# Patient Record
Sex: MALE | Race: WHITE | NOT HISPANIC OR LATINO | URBAN - METROPOLITAN AREA
[De-identification: names, ages, dates, MRNs, and addresses within clinical notes are randomized per-mention and may not be internally consistent; named-entity substitution may affect disease eponyms.]

---

## 2018-05-09 ENCOUNTER — OFFICE (AMBULATORY)
Dept: URBAN - METROPOLITAN AREA CLINIC 75 | Facility: CLINIC | Age: 53
End: 2018-05-09

## 2018-05-09 VITALS
HEIGHT: 77 IN | SYSTOLIC BLOOD PRESSURE: 138 MMHG | DIASTOLIC BLOOD PRESSURE: 72 MMHG | WEIGHT: 278 LBS | HEART RATE: 68 BPM

## 2018-05-09 DIAGNOSIS — R10.9 UNSPECIFIED ABDOMINAL PAIN: ICD-10-CM

## 2018-05-09 DIAGNOSIS — R19.7 DIARRHEA, UNSPECIFIED: ICD-10-CM

## 2018-05-09 PROCEDURE — 99244 OFF/OP CNSLTJ NEW/EST MOD 40: CPT

## 2018-05-25 VITALS
OXYGEN SATURATION: 95 % | DIASTOLIC BLOOD PRESSURE: 117 MMHG | DIASTOLIC BLOOD PRESSURE: 112 MMHG | HEART RATE: 75 BPM | DIASTOLIC BLOOD PRESSURE: 91 MMHG | SYSTOLIC BLOOD PRESSURE: 167 MMHG | HEIGHT: 77 IN | SYSTOLIC BLOOD PRESSURE: 184 MMHG | OXYGEN SATURATION: 90 % | OXYGEN SATURATION: 98 % | RESPIRATION RATE: 28 BRPM | RESPIRATION RATE: 20 BRPM | RESPIRATION RATE: 14 BRPM | SYSTOLIC BLOOD PRESSURE: 153 MMHG | HEART RATE: 73 BPM | HEART RATE: 70 BPM | DIASTOLIC BLOOD PRESSURE: 124 MMHG | TEMPERATURE: 98.4 F | HEART RATE: 72 BPM | SYSTOLIC BLOOD PRESSURE: 175 MMHG | DIASTOLIC BLOOD PRESSURE: 103 MMHG | SYSTOLIC BLOOD PRESSURE: 173 MMHG | RESPIRATION RATE: 22 BRPM | OXYGEN SATURATION: 96 % | HEART RATE: 77 BPM | DIASTOLIC BLOOD PRESSURE: 96 MMHG | HEART RATE: 78 BPM | DIASTOLIC BLOOD PRESSURE: 110 MMHG | TEMPERATURE: 98.6 F | SYSTOLIC BLOOD PRESSURE: 204 MMHG | OXYGEN SATURATION: 97 % | SYSTOLIC BLOOD PRESSURE: 177 MMHG | WEIGHT: 250 LBS

## 2018-05-30 ENCOUNTER — AMBULATORY SURGICAL CENTER (AMBULATORY)
Dept: URBAN - METROPOLITAN AREA SURGERY 17 | Facility: SURGERY | Age: 53
End: 2018-05-30

## 2018-05-30 ENCOUNTER — OFFICE (AMBULATORY)
Dept: URBAN - METROPOLITAN AREA PATHOLOGY 4 | Facility: PATHOLOGY | Age: 53
End: 2018-05-30
Payer: COMMERCIAL

## 2018-05-30 DIAGNOSIS — K20.9 ESOPHAGITIS, UNSPECIFIED: ICD-10-CM

## 2018-05-30 DIAGNOSIS — K29.50 UNSPECIFIED CHRONIC GASTRITIS WITHOUT BLEEDING: ICD-10-CM

## 2018-05-30 DIAGNOSIS — K21.0 GASTRO-ESOPHAGEAL REFLUX DISEASE WITH ESOPHAGITIS: ICD-10-CM

## 2018-05-30 DIAGNOSIS — R10.9 UNSPECIFIED ABDOMINAL PAIN: ICD-10-CM

## 2018-05-30 LAB
GI HISTOLOGY: A. UNSPECIFIED: (no result)
GI HISTOLOGY: B. UNSPECIFIED: (no result)
GI HISTOLOGY: PDF REPORT: (no result)

## 2018-05-30 PROCEDURE — 88305 TISSUE EXAM BY PATHOLOGIST: CPT | Performed by: INTERNAL MEDICINE

## 2018-05-30 PROCEDURE — 43239 EGD BIOPSY SINGLE/MULTIPLE: CPT | Performed by: INTERNAL MEDICINE

## 2018-05-30 RX ADMIN — PROPOFOL 50 MG: 10 INJECTION, EMULSION INTRAVENOUS at 14:46

## 2018-05-30 RX ADMIN — PROPOFOL 50 MG: 10 INJECTION, EMULSION INTRAVENOUS at 14:47

## 2018-05-30 RX ADMIN — LIDOCAINE HYDROCHLORIDE 50 MG: 10 INJECTION, SOLUTION EPIDURAL; INFILTRATION; INTRACAUDAL; PERINEURAL at 14:46

## 2018-05-30 RX ADMIN — PROPOFOL 100 MG: 10 INJECTION, EMULSION INTRAVENOUS at 14:46

## 2018-05-30 RX ADMIN — PROPOFOL 50 MG: 10 INJECTION, EMULSION INTRAVENOUS at 14:50

## 2018-05-30 NOTE — SERVICENOTES
CT with NAFLD, mild splenomegaly, hepatic hemagiomas, mild diverticular dx. Patient with 5 mo hx of LUQ pain ? etiology,,,,[place] past [place] of [place] ?? findings.
IF GES neg,,,consider repeating CN

## 2019-04-25 ENCOUNTER — TRANSCRIBE ORDERS (OUTPATIENT)
Dept: ADMINISTRATIVE | Facility: HOSPITAL | Age: 54
End: 2019-04-25

## 2019-04-25 DIAGNOSIS — G56.11 NEUROPATHY, MEDIAN NERVE, RIGHT: Primary | ICD-10-CM

## 2019-04-25 DIAGNOSIS — Z87.81 HISTORY OF NONUNION OF FRACTURE: Primary | ICD-10-CM

## 2019-05-13 ENCOUNTER — APPOINTMENT (OUTPATIENT)
Dept: CT IMAGING | Facility: HOSPITAL | Age: 54
End: 2019-05-13

## 2019-05-13 ENCOUNTER — HOSPITAL ENCOUNTER (OUTPATIENT)
Dept: INFUSION THERAPY | Facility: HOSPITAL | Age: 54
Discharge: HOME | End: 2019-05-13

## 2019-05-13 ENCOUNTER — HOSPITAL ENCOUNTER (OUTPATIENT)
Dept: GENERAL RADIOLOGY | Facility: HOSPITAL | Age: 54
Discharge: HOME | End: 2019-05-13

## 2019-05-21 ENCOUNTER — HOSPITAL ENCOUNTER (OUTPATIENT)
Dept: GENERAL RADIOLOGY | Facility: HOSPITAL | Age: 54
Discharge: HOME | End: 2019-05-21

## 2019-05-21 ENCOUNTER — APPOINTMENT (OUTPATIENT)
Dept: RADIOLOGY | Facility: HOSPITAL | Age: 54
End: 2019-05-21

## 2019-05-21 ENCOUNTER — HOSPITAL ENCOUNTER (OUTPATIENT)
Dept: GENERAL RADIOLOGY | Facility: HOSPITAL | Age: 54
Discharge: HOME | End: 2019-05-21
Admitting: RADIOLOGY

## 2019-05-21 ENCOUNTER — APPOINTMENT (OUTPATIENT)
Dept: CT IMAGING | Facility: HOSPITAL | Age: 54
End: 2019-05-21

## 2019-05-21 ENCOUNTER — HOSPITAL ENCOUNTER (OUTPATIENT)
Dept: CT IMAGING | Facility: HOSPITAL | Age: 54
Discharge: HOME | End: 2019-05-21

## 2019-05-21 VITALS
DIASTOLIC BLOOD PRESSURE: 77 MMHG | OXYGEN SATURATION: 98 % | SYSTOLIC BLOOD PRESSURE: 124 MMHG | BODY MASS INDEX: 30.11 KG/M2 | WEIGHT: 255 LBS | HEIGHT: 77 IN | TEMPERATURE: 97.7 F | HEART RATE: 87 BPM | RESPIRATION RATE: 16 BRPM

## 2019-05-21 DIAGNOSIS — Z87.81 HISTORY OF NONUNION OF FRACTURE: ICD-10-CM

## 2019-05-21 PROCEDURE — 62302 MYELOGRAPHY LUMBAR INJECTION: CPT

## 2019-05-21 PROCEDURE — 25010000002 IOPAMIDOL 61 % SOLUTION: Performed by: RADIOLOGY

## 2019-05-21 PROCEDURE — 72126 CT NECK SPINE W/DYE: CPT

## 2019-05-21 PROCEDURE — 72240 MYELOGRAPHY NECK SPINE: CPT

## 2019-05-21 RX ORDER — AMLODIPINE BESYLATE 2.5 MG/1
2.5 TABLET ORAL DAILY
COMMUNITY
Start: 2019-03-21 | End: 2020-03-20

## 2019-05-21 RX ORDER — FLUTICASONE PROPIONATE 50 MCG
2 SPRAY, SUSPENSION (ML) NASAL
COMMUNITY

## 2019-05-21 RX ORDER — METOCLOPRAMIDE 10 MG/1
10 TABLET ORAL 4 TIMES DAILY
COMMUNITY
Start: 2019-03-14

## 2019-05-21 RX ORDER — BLOOD-GLUCOSE CONTROL, NORMAL
1 EACH MISCELLANEOUS
COMMUNITY
Start: 2019-04-26 | End: 2022-11-29

## 2019-05-21 RX ORDER — HYDROCODONE BITARTRATE AND ACETAMINOPHEN 7.5; 325 MG/1; MG/1
TABLET ORAL
Refills: 0 | Status: ON HOLD | COMMUNITY
Start: 2019-05-01 | End: 2022-11-12

## 2019-05-21 RX ORDER — GLIMEPIRIDE 2 MG/1
2 TABLET ORAL
COMMUNITY
Start: 2019-04-26

## 2019-05-21 RX ORDER — OLMESARTAN MEDOXOMIL AND HYDROCHLOROTHIAZIDE 40/12.5 40; 12.5 MG/1; MG/1
1 TABLET ORAL DAILY
Status: ON HOLD | COMMUNITY
Start: 2019-03-21 | End: 2022-11-12

## 2019-05-21 RX ORDER — ESOMEPRAZOLE MAGNESIUM 40 MG/1
20 CAPSULE, DELAYED RELEASE ORAL
COMMUNITY
Start: 2019-02-15

## 2019-05-21 RX ORDER — LIDOCAINE HYDROCHLORIDE 10 MG/ML
10 INJECTION, SOLUTION INFILTRATION; PERINEURAL ONCE
Status: COMPLETED | OUTPATIENT
Start: 2019-05-21 | End: 2019-05-21

## 2019-05-21 RX ORDER — DIPHENOXYLATE HYDROCHLORIDE AND ATROPINE SULFATE 2.5; .025 MG/1; MG/1
1 TABLET ORAL DAILY
COMMUNITY

## 2019-05-21 RX ORDER — BLOOD-GLUCOSE METER
EACH MISCELLANEOUS SEE ADMIN INSTRUCTIONS
Refills: 0 | COMMUNITY
Start: 2019-04-26 | End: 2022-11-29

## 2019-05-21 RX ORDER — SILDENAFIL 100 MG/1
100 TABLET, FILM COATED ORAL AS NEEDED
COMMUNITY
Start: 2018-10-10 | End: 2022-11-29

## 2019-05-21 RX ORDER — LANCETS
EACH MISCELLANEOUS
Refills: 3 | COMMUNITY
Start: 2019-04-26 | End: 2022-11-29

## 2019-05-21 RX ORDER — TRAZODONE HYDROCHLORIDE 300 MG/1
TABLET ORAL
Status: ON HOLD | COMMUNITY
Start: 2019-05-17 | End: 2022-11-12

## 2019-05-21 RX ORDER — ALPRAZOLAM 0.5 MG/1
0.5 TABLET ORAL ONCE
Status: COMPLETED | OUTPATIENT
Start: 2019-05-21 | End: 2019-05-21

## 2019-05-21 RX ORDER — BLOOD SUGAR DIAGNOSTIC
STRIP MISCELLANEOUS
Refills: 0 | COMMUNITY
Start: 2019-04-26 | End: 2022-11-29

## 2019-05-21 RX ADMIN — LIDOCAINE HYDROCHLORIDE 5 ML: 10 INJECTION, SOLUTION INFILTRATION; PERINEURAL at 14:44

## 2019-05-21 RX ADMIN — ALPRAZOLAM 0.5 MG: 0.5 TABLET ORAL at 12:38

## 2019-05-21 RX ADMIN — IOPAMIDOL 15 ML: 612 INJECTION, SOLUTION INTRATHECAL at 14:46

## 2019-05-21 NOTE — NURSING NOTE
Returned to triage. Dressing to low back dry and intact. No complaint of headache. No distress. Fluids encouraged. Lunch served.

## 2019-05-21 NOTE — DISCHARGE INSTRUCTIONS
EDUCATION /DISCHARGE INSTRUCTIONS:  A myelogram is a special radiology procedure of the spinal cord, spinal nerves and other related structures.  You will be awake during the examination.  An area of your lower back will be cleansed with an antiseptic solution.  The physician will inject a numbing medication in your lower back.  While your back is numb, a needle will be placed in the lower back area.  A small amount of spinal fluid may be withdrawn and sent to the lab if ordered by your physician. While the needle is in the back, an injection of a contrast material (xray dye) will be given through the needle.  The contrast material will allow the physician to see the spinal cord and spinal nerves.  Once injected, the needle will be removed and a band aid will be placed over the injection site.  The table will be tilted during the process to allow the contrast material to flow to particular areas in the spine.  Following the injection and xrays, you will be taken to the CT scan where more pictures will be taken. After the procedure is finished, the contrast material will be absorbed by your body and eliminated through your kidneys.  The radiologist will study and interpret your myelogram and send the results to your physician.    Procedure risks of a myelogram include, but are not limited to:  *  Bleeding   *  seizure  *  Infection   *  Headache, possibly severe requiring  *  Contrast reaction      a blood patch  *  Nerve or cord injury  *  Paralysis and death    Benefits of the procedure:  *  Best examination for delineating pathology related to spinal cord compression from a disc and/or nerve root compression    Alternatives to the procedure:  MRI - a non invasive procedure requiring intravenous contrast injection.  Cannot be done on patients with certain pacemakers or metal in the body.  MRI risks include possible reaction to the contrast material, movement of metal located in the body.  Benefit to MRI:   Non-invasive and usually painless procedure.  THIS EDUCATION INFORMATION WAS REVIEWED PRIOR TO THE PROCEDURE AND CONSENT. Patient initials __________________Time_________________    Important information following your myelogram:  *  Lie down with your head elevated no more than 2 pillows for the next 24 hours.   *  Sit up in the car going home.  Sit up to eat and use the restroom only,  for 24 hours.  *  24 HOURS COMPLETE AT ________________________   *  Tomorrow, after 24 hours complete, take it easy and rest.  *  Do not drive for 48 hours following a myelogram  *  You may remove the bandage and shower in the morning  *  Increase your fluids for the next 24 hours.  Caffeinated drinks are encouraged.     Resume taking your blood thinner or Aspirin on ____5/22/19 after 2 pm_____    Resume taking your diabetic oral medication ___Metformin on 5/23/19 with evening dose__    Resume taking antipsychotics/antidepressant on ________N/A____________________    CALL YOUR PHYSICIAN FOR THE FOLLOWING:    * Pain at the injection site  * Reddness, swelling, bruising or drainage at the injection site  * A fever by mouth of 101.0  * Any new symptoms    If you have problems with a headache that is not relieved with rest and medication, please call the Radiology Triage Nurse desk  [phone]

## 2019-05-21 NOTE — H&P
Name: Fady Diane ADMIT: 2019   : 1965  PCP: Colby Escobar MD    MRN: [id number] LOS: 0 days   AGE/SEX: 53 y.o. male  ROOM: Room/bed info not found       Chief complaint   Patient is a 53 y.o. male presents with neck pain.     Past Surgical History:  Past Surgical History:   Procedure Laterality Date   • CERVICAL DISC SURGERY      cervical fusion and hardware   • ELBOW PROCEDURE Left     cubical tunnel repair   • HERNIA REPAIR      double hernia repair 15 years ago   • INNER EAR SURGERY Bilateral     reconstructed ear canals and ear drums   • KIDNEY STONE SURGERY      5 years ago   • NASAL SEPTUM SURGERY         Past Medical History:  Past Medical History:   Diagnosis Date   • Diabetes mellitus (CMS/HCC)    • GERD (gastroesophageal reflux disease)    • Hypertension        Home Medications:    (Not in a hospital admission)    Allergies:  Patient has no known allergies.    Family History:  No family history on file.    Social History:  Social History     Tobacco Use   • Smoking status: Not on file   Substance Use Topics   • Alcohol use: Not on file   • Drug use: Not on file        Objective     Physical Exam:   NAD   Symmetric chest rise, non labored breathing   Ab s-nd, nttp   Pulses 2+    Vital Signs  Temp:  [97.7 °F (36.5 °C)] 97.7 °F (36.5 °C)  Heart Rate:  [94] 94  Resp:  [16] 16  BP: (126)/(78) 126/78    Anticipated Surgical Procedure: FLUORO GUIDED LP FOR INTRATHECAL INJECTION OF CONTRAST AND SUBSEQUENT CT MYELOGRAM    The risks, benefits and alternatives of this procedure have been discussed with the patient or responsible party: Yes Risks discussed included but not limited to pain, bleeding (including epidural hemorrhage causing neurological compromise), infection, damaging surrounding structures, headache, intractable headache from dural CSF leak requiring a blood patch and need for further procedures. Denied anticoagulation      Demar Ye MD  19  2:07  PM

## 2019-05-21 NOTE — NURSING NOTE
1650 Patient dressed, ambulated to bathroom self, flat on stretcher resting waiting for ride.    1715 Ride arrived now, taken by wheelchair with staff member to vehicle.  Left with his .

## 2019-05-22 ENCOUNTER — TELEPHONE (OUTPATIENT)
Dept: INTERVENTIONAL RADIOLOGY/VASCULAR | Facility: HOSPITAL | Age: 54
End: 2019-05-22

## 2019-05-29 ENCOUNTER — APPOINTMENT (OUTPATIENT)
Dept: INFUSION THERAPY | Facility: HOSPITAL | Age: 54
End: 2019-05-29

## 2019-06-06 ENCOUNTER — HOSPITAL ENCOUNTER (OUTPATIENT)
Dept: INFUSION THERAPY | Facility: HOSPITAL | Age: 54
Discharge: HOME | End: 2019-06-06
Admitting: PSYCHIATRY & NEUROLOGY

## 2019-06-06 DIAGNOSIS — G56.11 NEUROPATHY, MEDIAN NERVE, RIGHT: ICD-10-CM

## 2019-06-06 PROCEDURE — 95886 MUSC TEST DONE W/N TEST COMP: CPT | Performed by: PSYCHIATRY & NEUROLOGY

## 2019-06-06 PROCEDURE — 95886 MUSC TEST DONE W/N TEST COMP: CPT

## 2019-06-06 PROCEDURE — 95910 NRV CNDJ TEST 7-8 STUDIES: CPT | Performed by: PSYCHIATRY & NEUROLOGY

## 2019-06-06 PROCEDURE — 95910 NRV CNDJ TEST 7-8 STUDIES: CPT

## 2019-06-06 NOTE — PROCEDURES
EMG REPORT    Indication: Neck pain    Findings: Right median sensory latency was prolonged at 3.8 ms.  Left median sensory latency was prolonged at 4.1 ms.  Right ulnar sensory study showed normal latency and amplitude.  Left median sensory latency showed no reliable response.  Right median motor study showed a prolonged distal latency of 4.5 ms.  Left median motor study showed a prolonged distal latency of 4.7 ms with normal velocity and amplitude.  Right ulnar motor study showed normal distal latency and amplitude.  Left median motor study showed prolonged distal latency of 6.1 ms.  Low amplitude response and slowed velocities across elbow more than distally.    Concentric needle EMG of bilateral deltoid, triceps, brachioradialis, extensor digitorum, and right first dorsal interosseous muscles showed no abnormality.  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  The amount is a mild degree and localized best to the carpal tunnel.  In addition there is evidence of a more severe chronic left ulnar neuropathy.  Localization is not possible because of the chronicity.  Needle EMG of both upper extremities failed to show evidence of superimposed cervical radiculopathy.       Pk Wilcox M.D.

## 2019-12-13 ENCOUNTER — LAB REQUISITION (OUTPATIENT)
Dept: LAB | Facility: HOSPITAL | Age: 54
End: 2019-12-13

## 2019-12-13 DIAGNOSIS — M48.02 SPINAL STENOSIS, CERVICAL REGION: ICD-10-CM

## 2019-12-13 LAB
ANION GAP SERPL CALCULATED.3IONS-SCNC: 13 MMOL/L (ref 5–15)
BASOPHILS # BLD AUTO: 0 10*3/MM3 (ref 0–0.2)
BASOPHILS NFR BLD AUTO: 0.2 % (ref 0–1.5)
BUN BLD-MCNC: 17 MG/DL (ref 6–20)
BUN/CREAT SERPL: 20 (ref 7–25)
CALCIUM SPEC-SCNC: 9.4 MG/DL (ref 8.6–10.5)
CHLORIDE SERPL-SCNC: 91 MMOL/L (ref 98–107)
CO2 SERPL-SCNC: 25 MMOL/L (ref 22–29)
CREAT BLD-MCNC: 0.85 MG/DL (ref 0.76–1.27)
DEPRECATED RDW RBC AUTO: 41.1 FL (ref 37–54)
EOSINOPHIL # BLD AUTO: 0 10*3/MM3 (ref 0–0.4)
EOSINOPHIL NFR BLD AUTO: 0.1 % (ref 0.3–6.2)
ERYTHROCYTE [DISTWIDTH] IN BLOOD BY AUTOMATED COUNT: 14.4 % (ref 12.3–15.4)
GFR SERPL CREATININE-BSD FRML MDRD: 94 ML/MIN/1.73
GLUCOSE BLD-MCNC: 289 MG/DL (ref 65–99)
HCT VFR BLD AUTO: 38.3 % (ref 37.5–51)
HGB BLD-MCNC: 13.1 G/DL (ref 13–17.7)
LYMPHOCYTES # BLD AUTO: 1 10*3/MM3 (ref 0.7–3.1)
LYMPHOCYTES NFR BLD AUTO: 8.3 % (ref 19.6–45.3)
MCH RBC QN AUTO: 27.7 PG (ref 26.6–33)
MCHC RBC AUTO-ENTMCNC: 34.3 G/DL (ref 31.5–35.7)
MCV RBC AUTO: 80.7 FL (ref 79–97)
MONOCYTES # BLD AUTO: 0.4 10*3/MM3 (ref 0.1–0.9)
MONOCYTES NFR BLD AUTO: 3 % (ref 5–12)
NEUTROPHILS # BLD AUTO: 11.2 10*3/MM3 (ref 1.7–7)
NEUTROPHILS NFR BLD AUTO: 88.4 % (ref 42.7–76)
NRBC BLD AUTO-RTO: 0 /100 WBC (ref 0–0.2)
PLATELET # BLD AUTO: 231 10*3/MM3 (ref 140–450)
PMV BLD AUTO: 6.9 FL (ref 6–12)
POTASSIUM BLD-SCNC: 4.6 MMOL/L (ref 3.5–5.2)
RBC # BLD AUTO: 4.75 10*6/MM3 (ref 4.14–5.8)
SODIUM BLD-SCNC: 129 MMOL/L (ref 136–145)
WBC NRBC COR # BLD: 12.7 10*3/MM3 (ref 3.4–10.8)

## 2019-12-13 PROCEDURE — 80048 BASIC METABOLIC PNL TOTAL CA: CPT

## 2019-12-13 PROCEDURE — 85025 COMPLETE CBC W/AUTO DIFF WBC: CPT

## 2019-12-14 ENCOUNTER — LAB REQUISITION (OUTPATIENT)
Dept: LAB | Facility: HOSPITAL | Age: 54
End: 2019-12-14

## 2019-12-14 DIAGNOSIS — E11.9 TYPE 2 DIABETES MELLITUS WITHOUT COMPLICATIONS (HCC): ICD-10-CM

## 2019-12-14 DIAGNOSIS — M48.02 SPINAL STENOSIS, CERVICAL REGION: ICD-10-CM

## 2019-12-14 DIAGNOSIS — K31.84 GASTROPARESIS: ICD-10-CM

## 2019-12-14 LAB
ANION GAP SERPL CALCULATED.3IONS-SCNC: 13 MMOL/L (ref 5–15)
BASOPHILS # BLD AUTO: 0 10*3/MM3 (ref 0–0.2)
BASOPHILS NFR BLD AUTO: 0.1 % (ref 0–1.5)
BUN BLD-MCNC: 21 MG/DL (ref 6–20)
BUN/CREAT SERPL: 25.6 (ref 7–25)
CALCIUM SPEC-SCNC: 9.1 MG/DL (ref 8.6–10.5)
CHLORIDE SERPL-SCNC: 96 MMOL/L (ref 98–107)
CO2 SERPL-SCNC: 24 MMOL/L (ref 22–29)
CREAT BLD-MCNC: 0.82 MG/DL (ref 0.76–1.27)
DEPRECATED RDW RBC AUTO: 41.6 FL (ref 37–54)
EOSINOPHIL # BLD AUTO: 0 10*3/MM3 (ref 0–0.4)
EOSINOPHIL NFR BLD AUTO: 0.1 % (ref 0.3–6.2)
ERYTHROCYTE [DISTWIDTH] IN BLOOD BY AUTOMATED COUNT: 14.4 % (ref 12.3–15.4)
GFR SERPL CREATININE-BSD FRML MDRD: 98 ML/MIN/1.73
GLUCOSE BLD-MCNC: 249 MG/DL (ref 65–99)
HCT VFR BLD AUTO: 36.8 % (ref 37.5–51)
HGB BLD-MCNC: 12.3 G/DL (ref 13–17.7)
LYMPHOCYTES # BLD AUTO: 1.1 10*3/MM3 (ref 0.7–3.1)
LYMPHOCYTES NFR BLD AUTO: 9.3 % (ref 19.6–45.3)
MCH RBC QN AUTO: 27.2 PG (ref 26.6–33)
MCHC RBC AUTO-ENTMCNC: 33.4 G/DL (ref 31.5–35.7)
MCV RBC AUTO: 81.3 FL (ref 79–97)
MONOCYTES # BLD AUTO: 0.4 10*3/MM3 (ref 0.1–0.9)
MONOCYTES NFR BLD AUTO: 3.3 % (ref 5–12)
NEUTROPHILS # BLD AUTO: 10.1 10*3/MM3 (ref 1.7–7)
NEUTROPHILS NFR BLD AUTO: 87.2 % (ref 42.7–76)
NRBC BLD AUTO-RTO: 0 /100 WBC (ref 0–0.2)
PLATELET # BLD AUTO: 224 10*3/MM3 (ref 140–450)
PMV BLD AUTO: 7.2 FL (ref 6–12)
POTASSIUM BLD-SCNC: 5.1 MMOL/L (ref 3.5–5.2)
RBC # BLD AUTO: 4.53 10*6/MM3 (ref 4.14–5.8)
SODIUM BLD-SCNC: 133 MMOL/L (ref 136–145)
WBC NRBC COR # BLD: 11.6 10*3/MM3 (ref 3.4–10.8)

## 2019-12-14 PROCEDURE — 85025 COMPLETE CBC W/AUTO DIFF WBC: CPT | Performed by: ORTHOPAEDIC SURGERY

## 2019-12-14 PROCEDURE — 80048 BASIC METABOLIC PNL TOTAL CA: CPT | Performed by: ORTHOPAEDIC SURGERY

## 2019-12-15 ENCOUNTER — LAB REQUISITION (OUTPATIENT)
Dept: LAB | Facility: HOSPITAL | Age: 54
End: 2019-12-15

## 2019-12-15 DIAGNOSIS — K31.84 GASTROPARESIS: ICD-10-CM

## 2019-12-15 DIAGNOSIS — M48.02 SPINAL STENOSIS, CERVICAL REGION: ICD-10-CM

## 2019-12-15 DIAGNOSIS — I10 ESSENTIAL (PRIMARY) HYPERTENSION: ICD-10-CM

## 2019-12-15 DIAGNOSIS — E11.9 TYPE 2 DIABETES MELLITUS WITHOUT COMPLICATIONS (HCC): ICD-10-CM

## 2019-12-15 LAB
ANION GAP SERPL CALCULATED.3IONS-SCNC: 9 MMOL/L (ref 5–15)
BASOPHILS # BLD AUTO: 0 10*3/MM3 (ref 0–0.2)
BASOPHILS NFR BLD AUTO: 0.1 % (ref 0–1.5)
BUN BLD-MCNC: 16 MG/DL (ref 6–20)
BUN/CREAT SERPL: 18 (ref 7–25)
CALCIUM SPEC-SCNC: 9.7 MG/DL (ref 8.6–10.5)
CHLORIDE SERPL-SCNC: 96 MMOL/L (ref 98–107)
CO2 SERPL-SCNC: 29 MMOL/L (ref 22–29)
CREAT BLD-MCNC: 0.89 MG/DL (ref 0.76–1.27)
DEPRECATED RDW RBC AUTO: 40.3 FL (ref 37–54)
EOSINOPHIL # BLD AUTO: 0.1 10*3/MM3 (ref 0–0.4)
EOSINOPHIL NFR BLD AUTO: 0.7 % (ref 0.3–6.2)
ERYTHROCYTE [DISTWIDTH] IN BLOOD BY AUTOMATED COUNT: 14.1 % (ref 12.3–15.4)
GFR SERPL CREATININE-BSD FRML MDRD: 89 ML/MIN/1.73
GLUCOSE BLD-MCNC: 157 MG/DL (ref 65–99)
HCT VFR BLD AUTO: 37.7 % (ref 37.5–51)
HGB BLD-MCNC: 13 G/DL (ref 13–17.7)
LYMPHOCYTES # BLD AUTO: 2.9 10*3/MM3 (ref 0.7–3.1)
LYMPHOCYTES NFR BLD AUTO: 24.2 % (ref 19.6–45.3)
MCH RBC QN AUTO: 27.6 PG (ref 26.6–33)
MCHC RBC AUTO-ENTMCNC: 34.5 G/DL (ref 31.5–35.7)
MCV RBC AUTO: 80.1 FL (ref 79–97)
MONOCYTES # BLD AUTO: 1.2 10*3/MM3 (ref 0.1–0.9)
MONOCYTES NFR BLD AUTO: 9.8 % (ref 5–12)
NEUTROPHILS # BLD AUTO: 7.8 10*3/MM3 (ref 1.7–7)
NEUTROPHILS NFR BLD AUTO: 65.2 % (ref 42.7–76)
NRBC BLD AUTO-RTO: 0 /100 WBC (ref 0–0.2)
PLATELET # BLD AUTO: 222 10*3/MM3 (ref 140–450)
PMV BLD AUTO: 6.7 FL (ref 6–12)
POTASSIUM BLD-SCNC: 4.3 MMOL/L (ref 3.5–5.2)
RBC # BLD AUTO: 4.71 10*6/MM3 (ref 4.14–5.8)
SODIUM BLD-SCNC: 134 MMOL/L (ref 136–145)
WBC NRBC COR # BLD: 12 10*3/MM3 (ref 3.4–10.8)

## 2019-12-15 PROCEDURE — 85025 COMPLETE CBC W/AUTO DIFF WBC: CPT | Performed by: ORTHOPAEDIC SURGERY

## 2019-12-15 PROCEDURE — 80048 BASIC METABOLIC PNL TOTAL CA: CPT | Performed by: ORTHOPAEDIC SURGERY

## 2020-05-14 ENCOUNTER — APPOINTMENT (OUTPATIENT)
Dept: CT IMAGING | Facility: HOSPITAL | Age: 55
End: 2020-05-14

## 2020-05-14 ENCOUNTER — HOSPITAL ENCOUNTER (EMERGENCY)
Facility: HOSPITAL | Age: 55
Discharge: LEFT BEFORE DISCHARGE | End: 2020-05-14

## 2020-05-14 ENCOUNTER — HOSPITAL ENCOUNTER (EMERGENCY)
Facility: HOSPITAL | Age: 55
Discharge: HOME | End: 2020-05-14
Attending: EMERGENCY MEDICINE | Admitting: EMERGENCY MEDICINE

## 2020-05-14 VITALS
RESPIRATION RATE: 16 BRPM | HEART RATE: 89 BPM | WEIGHT: 255 LBS | TEMPERATURE: 98 F | SYSTOLIC BLOOD PRESSURE: 153 MMHG | BODY MASS INDEX: 30.11 KG/M2 | HEIGHT: 77 IN | DIASTOLIC BLOOD PRESSURE: 98 MMHG | OXYGEN SATURATION: 97 %

## 2020-05-14 VITALS
RESPIRATION RATE: 18 BRPM | HEIGHT: 77 IN | TEMPERATURE: 97.6 F | OXYGEN SATURATION: 97 % | BODY MASS INDEX: 30.24 KG/M2 | HEART RATE: 82 BPM

## 2020-05-14 DIAGNOSIS — N20.1 RIGHT URETERAL STONE: Primary | ICD-10-CM

## 2020-05-14 LAB
ALBUMIN SERPL-MCNC: 4.6 G/DL (ref 3.5–5.2)
ALBUMIN/GLOB SERPL: 1.9 G/DL
ALP SERPL-CCNC: 56 U/L (ref 39–117)
ALT SERPL W P-5'-P-CCNC: 36 U/L (ref 1–41)
ANION GAP SERPL CALCULATED.3IONS-SCNC: 13.5 MMOL/L (ref 5–15)
AST SERPL-CCNC: 25 U/L (ref 1–40)
BACTERIA UR QL AUTO: ABNORMAL /HPF
BASOPHILS # BLD AUTO: 0.03 10*3/MM3 (ref 0–0.2)
BASOPHILS NFR BLD AUTO: 0.2 % (ref 0–1.5)
BILIRUB SERPL-MCNC: 0.6 MG/DL (ref 0.2–1.2)
BILIRUB UR QL STRIP: NEGATIVE
BUN BLD-MCNC: 15 MG/DL (ref 6–20)
BUN/CREAT SERPL: 12.5 (ref 7–25)
CALCIUM SPEC-SCNC: 9.5 MG/DL (ref 8.6–10.5)
CHLORIDE SERPL-SCNC: 98 MMOL/L (ref 98–107)
CLARITY UR: CLEAR
CO2 SERPL-SCNC: 26.5 MMOL/L (ref 22–29)
COLOR UR: YELLOW
CREAT BLD-MCNC: 1.2 MG/DL (ref 0.76–1.27)
DEPRECATED RDW RBC AUTO: 39.7 FL (ref 37–54)
EOSINOPHIL # BLD AUTO: 0.06 10*3/MM3 (ref 0–0.4)
EOSINOPHIL NFR BLD AUTO: 0.5 % (ref 0.3–6.2)
ERYTHROCYTE [DISTWIDTH] IN BLOOD BY AUTOMATED COUNT: 13.7 % (ref 12.3–15.4)
GFR SERPL CREATININE-BSD FRML MDRD: 63 ML/MIN/1.73
GLOBULIN UR ELPH-MCNC: 2.4 GM/DL
GLUCOSE BLD-MCNC: 216 MG/DL (ref 65–99)
GLUCOSE UR STRIP-MCNC: NEGATIVE MG/DL
HCT VFR BLD AUTO: 39.3 % (ref 37.5–51)
HGB BLD-MCNC: 13.2 G/DL (ref 13–17.7)
HGB UR QL STRIP.AUTO: NEGATIVE
HOLD SPECIMEN: NORMAL
HYALINE CASTS UR QL AUTO: ABNORMAL /LPF
IMM GRANULOCYTES # BLD AUTO: 0.06 10*3/MM3 (ref 0–0.05)
IMM GRANULOCYTES NFR BLD AUTO: 0.5 % (ref 0–0.5)
KETONES UR QL STRIP: ABNORMAL
LEUKOCYTE ESTERASE UR QL STRIP.AUTO: ABNORMAL
LYMPHOCYTES # BLD AUTO: 1.55 10*3/MM3 (ref 0.7–3.1)
LYMPHOCYTES NFR BLD AUTO: 12.6 % (ref 19.6–45.3)
MCH RBC QN AUTO: 27.1 PG (ref 26.6–33)
MCHC RBC AUTO-ENTMCNC: 33.6 G/DL (ref 31.5–35.7)
MCV RBC AUTO: 80.7 FL (ref 79–97)
MONOCYTES # BLD AUTO: 0.73 10*3/MM3 (ref 0.1–0.9)
MONOCYTES NFR BLD AUTO: 5.9 % (ref 5–12)
NEUTROPHILS # BLD AUTO: 9.84 10*3/MM3 (ref 1.7–7)
NEUTROPHILS NFR BLD AUTO: 80.3 % (ref 42.7–76)
NITRITE UR QL STRIP: NEGATIVE
NRBC BLD AUTO-RTO: 0 /100 WBC (ref 0–0.2)
PH UR STRIP.AUTO: 6 [PH] (ref 5–8)
PLATELET # BLD AUTO: 207 10*3/MM3 (ref 140–450)
PMV BLD AUTO: 8.8 FL (ref 6–12)
POTASSIUM BLD-SCNC: 4.2 MMOL/L (ref 3.5–5.2)
PROT SERPL-MCNC: 7 G/DL (ref 6–8.5)
PROT UR QL STRIP: NEGATIVE
RBC # BLD AUTO: 4.87 10*6/MM3 (ref 4.14–5.8)
RBC # UR: ABNORMAL /HPF
REF LAB TEST METHOD: ABNORMAL
SODIUM BLD-SCNC: 138 MMOL/L (ref 136–145)
SP GR UR STRIP: 1.03 (ref 1–1.03)
SQUAMOUS #/AREA URNS HPF: ABNORMAL /HPF
UROBILINOGEN UR QL STRIP: ABNORMAL
WBC NRBC COR # BLD: 12.27 10*3/MM3 (ref 3.4–10.8)
WBC UR QL AUTO: ABNORMAL /HPF
WHOLE BLOOD HOLD SPECIMEN: NORMAL
WHOLE BLOOD HOLD SPECIMEN: NORMAL

## 2020-05-14 PROCEDURE — 96375 TX/PRO/DX INJ NEW DRUG ADDON: CPT

## 2020-05-14 PROCEDURE — 96374 THER/PROPH/DIAG INJ IV PUSH: CPT

## 2020-05-14 PROCEDURE — 99211 OFF/OP EST MAY X REQ PHY/QHP: CPT

## 2020-05-14 PROCEDURE — 80053 COMPREHEN METABOLIC PANEL: CPT | Performed by: PHYSICIAN ASSISTANT

## 2020-05-14 PROCEDURE — 25010000002 KETOROLAC TROMETHAMINE PER 15 MG: Performed by: PHYSICIAN ASSISTANT

## 2020-05-14 PROCEDURE — 99283 EMERGENCY DEPT VISIT LOW MDM: CPT

## 2020-05-14 PROCEDURE — 25010000002 ONDANSETRON PER 1 MG: Performed by: PHYSICIAN ASSISTANT

## 2020-05-14 PROCEDURE — 81001 URINALYSIS AUTO W/SCOPE: CPT

## 2020-05-14 PROCEDURE — 74176 CT ABD & PELVIS W/O CONTRAST: CPT

## 2020-05-14 PROCEDURE — 85025 COMPLETE CBC W/AUTO DIFF WBC: CPT | Performed by: PHYSICIAN ASSISTANT

## 2020-05-14 RX ORDER — KETOROLAC TROMETHAMINE 10 MG/1
10 TABLET, FILM COATED ORAL EVERY 8 HOURS PRN
Qty: 15 TABLET | Refills: 0 | Status: ON HOLD | OUTPATIENT
Start: 2020-05-14 | End: 2022-11-12

## 2020-05-14 RX ORDER — OXYCODONE HYDROCHLORIDE AND ACETAMINOPHEN 5; 325 MG/1; MG/1
1 TABLET ORAL EVERY 4 HOURS PRN
Qty: 10 TABLET | Refills: 0 | Status: ON HOLD | OUTPATIENT
Start: 2020-05-14 | End: 2022-11-12

## 2020-05-14 RX ORDER — ONDANSETRON 2 MG/ML
4 INJECTION INTRAMUSCULAR; INTRAVENOUS ONCE
Status: COMPLETED | OUTPATIENT
Start: 2020-05-14 | End: 2020-05-14

## 2020-05-14 RX ORDER — KETOROLAC TROMETHAMINE 15 MG/ML
15 INJECTION, SOLUTION INTRAMUSCULAR; INTRAVENOUS ONCE
Status: COMPLETED | OUTPATIENT
Start: 2020-05-14 | End: 2020-05-14

## 2020-05-14 RX ORDER — ONDANSETRON 4 MG/1
4 TABLET, ORALLY DISINTEGRATING ORAL EVERY 8 HOURS PRN
Qty: 10 TABLET | Refills: 0 | Status: ON HOLD | OUTPATIENT
Start: 2020-05-14 | End: 2022-11-12

## 2020-05-14 RX ORDER — TAMSULOSIN HYDROCHLORIDE 0.4 MG/1
1 CAPSULE ORAL DAILY
Qty: 30 CAPSULE | Refills: 0 | Status: ON HOLD | OUTPATIENT
Start: 2020-05-14 | End: 2022-11-12

## 2020-05-14 RX ADMIN — SODIUM CHLORIDE, POTASSIUM CHLORIDE, SODIUM LACTATE AND CALCIUM CHLORIDE 1000 ML: 600; 310; 30; 20 INJECTION, SOLUTION INTRAVENOUS at 18:33

## 2020-05-14 RX ADMIN — ONDANSETRON 4 MG: 2 INJECTION INTRAMUSCULAR; INTRAVENOUS at 18:30

## 2020-05-14 RX ADMIN — KETOROLAC TROMETHAMINE 15 MG: 15 INJECTION, SOLUTION INTRAMUSCULAR; INTRAVENOUS at 18:32

## 2020-05-14 NOTE — ED PROVIDER NOTES
Pt presents to the ED c/o  acute onset right flank pain that began last night. He does have a hx of kidney stones.      On exam,   Mild RUQ tenderness and right flank tenderness.     Plan: Discussed CT findings of 6mm R ureteral stone.  Pain is well controlled.  Home with pain control, urology folllow up, return precautions.     Ct Abdomen Pelvis Without Contrast    Result Date: 2020  Narrative: CT SCAN OF THE ABDOMEN AND PELVIS WITHOUT CONTRAST  HISTORY: Right flank pain.  FINDINGS: The CT scan was performed as an emergency procedure through the abdomen and pelvis without contrast and demonstrates the followin. There is mild to moderate right renal obstruction secondary to a 7 mm stone at the right ureteropelvic junction as seen on image 78. There are several smaller stones scattered in the right kidney. There is no left renal stone but there is a probable faintly visualized small left renal cyst anteriorly. 2. There is some minimal faint groundglass opacity at the lung bases that is nonspecific. There are several tiny low-density lesions in the liver likely representing tiny cysts. The spleen is slightly prominent in size. The pancreas and both adrenal glands and gallbladder are unremarkable. 3. There is no aortic aneurysm or retroperitoneal lymphadenopathy. No abnormality is seen in the pelvis.      Radiation dose reduction techniques were utilized, including automated exposure control and exposure modulation based on body size.  This report was finalized on 2020 8:03 PM by Dr. Ganesh Vargas M.D.        Final diagnoses:   Right ureteral stone       DISCHARGE    Patient discharged in stable condition.    Reviewed implications of results, diagnosis, meds, responsibility to follow up, warning signs and symptoms of possible worsening, potential complications and reasons to return to ER, including worsening pain, fever.    Patient/Family voiced understanding of above instructions.    Discussed plan for  discharge, as there is no emergent indication for admission. Patient referred to primary care provider for BP management due to today's BP. Pt/family is agreeable and understands need for follow up and repeat testing.  Pt is aware that discharge does not mean that nothing is wrong but it indicates no emergency is present that requires admission and they must continue care with follow-up as given below or physician of their choice.     FOLLOW-UP  Babak Hopkins MD  [address] [place] [address]  [phone]    Schedule an appointment as soon as possible for a visit            Medication List      New Prescriptions    ketorolac 10 MG tablet  Commonly known as:  TORADOL  Take 1 tablet by mouth Every 8 (Eight) Hours As Needed for Moderate Pain .     ondansetron ODT 4 MG disintegrating tablet  Commonly known as:  ZOFRAN-ODT  Place 1 tablet on the tongue Every 8 (Eight) Hours As Needed for Nausea.     oxyCODONE-acetaminophen 5-325 MG per tablet  Commonly known as:  PERCOCET  Take 1 tablet by mouth Every 4 (Four) Hours As Needed for Severe Pain .     tamsulosin 0.4 MG capsule 24 hr capsule  Commonly known as:  FLOMAX  Take 1 capsule by mouth Daily.               Attestation:  The ANTONELLA and I have discussed this patient's history, physical exam, and treatment plan.  I have reviewed the documentation and personally had a face to face interaction with the patient. I affirm the documentation and agree with the treatment and plan.  The attached note describes my personal findings.          Lazaro Dixon MD  05/14/20 4052

## 2020-05-14 NOTE — ED PROVIDER NOTES
" EMERGENCY DEPARTMENT ENCOUNTER    Room Number:  44/44  Date seen:  5/14/2020  Time seen: 5:38 PM  PCP: Colby Escobar MD  Historian: patient      HPI:  Chief Complaint: right flank pain    A complete HPI/ROS/PMH/PSH/SH/FH are unobtainable due to: none    Context: Fady Diane is a 54 y.o. male who presents to the ED for evaluation of sudden onset right flank pain that began yesterday around 10 or 11 PM.  He states he came to the ER then however it resolved upon arrival so he went back home.  It came back earlier which prompted him to come to the emergency room.  He rates it at a \"12 out of 10.\"  States it radiates to the right lower abdomen, is now constant and nothing seems to make it worse or better.  It is sharp and achy.  He has had some mild nausea but no vomiting, denies fever, chills, hematuria, dysuria, diarrhea.  He has a history of kidney stones and this feels the same.        PAST MEDICAL HISTORY  Active Ambulatory Problems     Diagnosis Date Noted   • No Active Ambulatory Problems     Resolved Ambulatory Problems     Diagnosis Date Noted   • No Resolved Ambulatory Problems     Past Medical History:   Diagnosis Date   • Diabetes mellitus (CMS/HCC)    • GERD (gastroesophageal reflux disease)    • Hypertension          PAST SURGICAL HISTORY  Past Surgical History:   Procedure Laterality Date   • CERVICAL DISC SURGERY  2014    cervical fusion and hardware   • ELBOW PROCEDURE Left     cubical tunnel repair   • HERNIA REPAIR      double hernia repair 15 years ago   • INNER EAR SURGERY Bilateral 2011    reconstructed ear canals and ear drums   • KIDNEY STONE SURGERY      5 years ago   • NASAL SEPTUM SURGERY  2009         FAMILY HISTORY  No family history on file.      SOCIAL HISTORY  Social History     Socioeconomic History   • Marital status:      Spouse name: Not on file   • Number of children: Not on file   • Years of education: Not on file   • Highest education level: Not on file "         ALLERGIES  Patient has no known allergies.        REVIEW OF SYSTEMS  Review of Systems     All systems reviewed and negative except for those discussed in HPI.       PHYSICAL EXAM  ED Triage Vitals [05/14/20 1732]   Temp Heart Rate Resp BP SpO2   98 °F (36.7 °C) 98 16 -- 98 %      Temp src Heart Rate Source Patient Position BP Location FiO2 (%)   -- -- -- -- --         GENERAL: not distressed  HENT: atraumatic  EYES: no scleral icterus  CV: regular rhythm, regular rate  RESPIRATORY: normal effort, ctab  ABDOMEN: soft, nontender, nondistended, normal bowel sounds, + right CVA tenderness  MUSCULOSKELETAL: no deformity  NEURO: alert, moves all extremities, follows commands  SKIN: warm, dry    Vital signs and nursing notes reviewed.          LAB RESULTS  Recent Results (from the past 24 hour(s))   Urinalysis With Microscopic If Indicated (No Culture) - Urine, Clean Catch    Collection Time: 05/14/20  5:46 PM   Result Value Ref Range    Color, UA Yellow Yellow, Straw    Appearance, UA Clear Clear    pH, UA 6.0 5.0 - 8.0    Specific Gravity, UA 1.027 1.005 - 1.030    Glucose, UA Negative Negative    Ketones, UA Trace (A) Negative    Bilirubin, UA Negative Negative    Blood, UA Negative Negative    Protein, UA Negative Negative    Leuk Esterase, UA Trace (A) Negative    Nitrite, UA Negative Negative    Urobilinogen, UA 1.0 E.U./dL 0.2 - 1.0 E.U./dL   Light Blue Top    Collection Time: 05/14/20  5:46 PM   Result Value Ref Range    Extra Tube hold for add-on    Green Top (Gel)    Collection Time: 05/14/20  5:46 PM   Result Value Ref Range    Extra Tube Hold for add-ons.    Lavender Top    Collection Time: 05/14/20  5:46 PM   Result Value Ref Range    Extra Tube hold for add-on    Urinalysis, Microscopic Only - Urine, Clean Catch    Collection Time: 05/14/20  5:46 PM   Result Value Ref Range    RBC, UA 6-12 (A) None Seen, 0-2 /HPF    WBC, UA 6-12 (A) None Seen, 0-2 /HPF    Bacteria, UA None Seen None Seen /HPF     Squamous Epithelial Cells, UA 0-2 None Seen, 0-2 /HPF    Hyaline Casts, UA 3-6 None Seen /LPF    Methodology Automated Microscopy    Comprehensive Metabolic Panel    Collection Time: 05/14/20  5:46 PM   Result Value Ref Range    Glucose 216 (H) 65 - 99 mg/dL    BUN 15 6 - 20 mg/dL    Creatinine 1.20 0.76 - 1.27 mg/dL    Sodium 138 136 - 145 mmol/L    Potassium 4.2 3.5 - 5.2 mmol/L    Chloride 98 98 - 107 mmol/L    CO2 26.5 22.0 - 29.0 mmol/L    Calcium 9.5 8.6 - 10.5 mg/dL    Total Protein 7.0 6.0 - 8.5 g/dL    Albumin 4.60 3.50 - 5.20 g/dL    ALT (SGPT) 36 1 - 41 U/L    AST (SGOT) 25 1 - 40 U/L    Alkaline Phosphatase 56 39 - 117 U/L    Total Bilirubin 0.6 0.2 - 1.2 mg/dL    eGFR Non African Amer 63 >60 mL/min/1.73    Globulin 2.4 gm/dL    A/G Ratio 1.9 g/dL    BUN/Creatinine Ratio 12.5 7.0 - 25.0    Anion Gap 13.5 5.0 - 15.0 mmol/L   CBC Auto Differential    Collection Time: 05/14/20  5:46 PM   Result Value Ref Range    WBC 12.27 (H) 3.40 - 10.80 10*3/mm3    RBC 4.87 4.14 - 5.80 10*6/mm3    Hemoglobin 13.2 13.0 - 17.7 g/dL    Hematocrit 39.3 37.5 - 51.0 %    MCV 80.7 79.0 - 97.0 fL    MCH 27.1 26.6 - 33.0 pg    MCHC 33.6 31.5 - 35.7 g/dL    RDW 13.7 12.3 - 15.4 %    RDW-SD 39.7 37.0 - 54.0 fl    MPV 8.8 6.0 - 12.0 fL    Platelets 207 140 - 450 10*3/mm3    Neutrophil % 80.3 (H) 42.7 - 76.0 %    Lymphocyte % 12.6 (L) 19.6 - 45.3 %    Monocyte % 5.9 5.0 - 12.0 %    Eosinophil % 0.5 0.3 - 6.2 %    Basophil % 0.2 0.0 - 1.5 %    Immature Grans % 0.5 0.0 - 0.5 %    Neutrophils, Absolute 9.84 (H) 1.70 - 7.00 10*3/mm3    Lymphocytes, Absolute 1.55 0.70 - 3.10 10*3/mm3    Monocytes, Absolute 0.73 0.10 - 0.90 10*3/mm3    Eosinophils, Absolute 0.06 0.00 - 0.40 10*3/mm3    Basophils, Absolute 0.03 0.00 - 0.20 10*3/mm3    Immature Grans, Absolute 0.06 (H) 0.00 - 0.05 10*3/mm3    nRBC 0.0 0.0 - 0.2 /100 WBC       Ordered the above labs and independently reviewed the results.        RADIOLOGY  CT Abdomen Pelvis Without Contrast    Final Result          I ordered the above noted radiological studies. Reviewed by me and discussed with radiologist.  See dictation for official radiology interpretation.    PROCEDURES  Procedures        MEDICATIONS GIVEN IN ER  Medications   ketorolac (TORADOL) injection 15 mg (15 mg Intravenous Given 5/14/20 1832)   ondansetron (ZOFRAN) injection 4 mg (4 mg Intravenous Given 5/14/20 1830)   lactated ringers bolus 1,000 mL (0 mL Intravenous Stopped 5/14/20 1903)             PROGRESS AND CONSULTS    DDX includes but no limited to renal colic, ureterolithiasis, pyelonephritis, muscle strain, muscle spasm, appendicitis    ED Course as of May 14 2024   Thu May 14, 2020   1918 I discussed the CT scan of the abdomen and pelvis with Dr. Vargas which shows a 7 mm stone at the right UPJ.    [KA]   1919 WBC(!): 12.27 [KA]   1919 RBC: 4.87 [KA]   1919 Hemoglobin: 13.2 [KA]   1919 Hematocrit: 39.3 [KA]   1919 Glucose(!): 216 [KA]   1919 BUN: 15 [KA]   1919 Creatinine: 1.20 [KA]   1919 RBC, UA(!): 6-12 [KA]   1919 WBC, UA(!): 6-12 [KA]   1919 Bacteria, UA: None Seen [KA]   1919 The patient got very good pain relief with Toradol.  He required nothing additional for pain.  CT scan shows right ureterolithiasis at the UPJ.  We discussed all lab and imaging results with the patient, he is to follow-up with urology and has been prescribed Zofran, Percocet, and Flomax.  He will follow-up with urology.  He is agreeable with the plan for discharge.  Return precautions discussed.   Nitrite, UA: Negative [KA]   1923 Amrit Report  Amrit report [id number] reviewed.   After examining the available clinical information and risks of prescribing controlled substances (including non-treatment or other adjunct treatments), it is considered medically appropriate that a controlled medication be prescribed.  I discussed risks/benefits/alternatives of using a controlled substance including risk of tolerance and dependence.  I discussed with  patient (and family if applicable) that the patient should not drive, operate machinery, or otherwise engage in risky behavior as this medication may impair judgment and/or motor capabilities. I discussed that the patient will receive only a short-term controlled substance prescription for management of acute symptoms and that any additional medication needs should be addressed by the primary care provider or appropriate specialist.      [KA]      ED Course User Index  [KA] Gracia Gray PA        Reviewed pt's history and workup with Dr. Dixon.  After a bedside evaluation; Dr Dixon agrees with the plan of care      Patient was placed in face mask in first look. Patient was wearing facemask each time I entered the room and throughout our encounter. I wore protective equipment throughout this patient encounter including a face mask, eye shield and gloves. Hand hygiene was performed before donning protective equipment and after removal when leaving the room.        DIAGNOSIS  Final diagnoses:   Right ureteral stone         DISPOSITION  Discharge        Latest Documented Vital Signs:  As of 20:24  BP- 153/98 HR- 89 Temp- 98 °F (36.7 °C) O2 sat- 97%       Gracia Gray PA  05/14/20 2025

## 2020-05-14 NOTE — ED NOTES
Pt presents to the ED w/cc right flank pain.  Pt states he thinks he is experiencing a kidney stone.  Pt hx renal stones.  Denies fever, chills, n/v/d, recent trauma or illness.    Pt masked at triage  Triage completed with appropriate PPE     Magali Thomas, RN  05/14/20 0253

## 2020-05-14 NOTE — ED NOTES
This RN entered exam room to collect urine specimen from pt. Pt not in room. Thorough search of the ED determined pt had eloped. MD Cantor, NIKOLAS Reyna, Charge RN Denisse notified.      Krystal Sewell RN  05/14/20 [id number]

## 2020-05-14 NOTE — ED TRIAGE NOTES
Patient presents to er via private vehicle from home.  Patient was here at ER last night but pain resolved so he left.  Patient states his right flank pain has returned so he is requesting further er evaluation.  Patient was placed in face mask during first look triage.  Patient was wearing a face mask throughout encounter.  I wore personal protective equipment throughout the encounter.  Hand hygiene was performed before and after patient encounter.

## 2022-11-11 ENCOUNTER — APPOINTMENT (OUTPATIENT)
Dept: CT IMAGING | Facility: HOSPITAL | Age: 57
End: 2022-11-11

## 2022-11-11 ENCOUNTER — APPOINTMENT (OUTPATIENT)
Dept: ULTRASOUND IMAGING | Facility: HOSPITAL | Age: 57
End: 2022-11-11

## 2022-11-11 ENCOUNTER — APPOINTMENT (OUTPATIENT)
Dept: GENERAL RADIOLOGY | Facility: HOSPITAL | Age: 57
End: 2022-11-11

## 2022-11-11 ENCOUNTER — HOSPITAL ENCOUNTER (OUTPATIENT)
Facility: HOSPITAL | Age: 57
Discharge: HOME | End: 2022-11-13
Attending: EMERGENCY MEDICINE | Admitting: SURGERY

## 2022-11-11 DIAGNOSIS — K81.0 ACUTE CHOLECYSTITIS: Primary | ICD-10-CM

## 2022-11-11 DIAGNOSIS — K82.9 GALLBLADDER DISEASE: ICD-10-CM

## 2022-11-11 DIAGNOSIS — I10 HYPERTENSION NOT AT GOAL: ICD-10-CM

## 2022-11-11 LAB
ALBUMIN SERPL-MCNC: 4.6 G/DL (ref 3.5–5.2)
ALBUMIN/GLOB SERPL: 1.6 G/DL
ALP SERPL-CCNC: 82 U/L (ref 39–117)
ALT SERPL W P-5'-P-CCNC: 23 U/L (ref 1–41)
ANION GAP SERPL CALCULATED.3IONS-SCNC: 15.6 MMOL/L (ref 5–15)
AST SERPL-CCNC: 32 U/L (ref 1–40)
BASOPHILS # BLD AUTO: 0.04 10*3/MM3 (ref 0–0.2)
BASOPHILS NFR BLD AUTO: 0.3 % (ref 0–1.5)
BILIRUB SERPL-MCNC: 1.3 MG/DL (ref 0–1.2)
BUN SERPL-MCNC: 13 MG/DL (ref 6–20)
BUN/CREAT SERPL: 11.9 (ref 7–25)
CALCIUM SPEC-SCNC: 9.6 MG/DL (ref 8.6–10.5)
CHLORIDE SERPL-SCNC: 102 MMOL/L (ref 98–107)
CO2 SERPL-SCNC: 22.4 MMOL/L (ref 22–29)
CREAT SERPL-MCNC: 1.09 MG/DL (ref 0.76–1.27)
DEPRECATED RDW RBC AUTO: 41.7 FL (ref 37–54)
EGFRCR SERPLBLD CKD-EPI 2021: 78.7 ML/MIN/1.73
EOSINOPHIL # BLD AUTO: 0.16 10*3/MM3 (ref 0–0.4)
EOSINOPHIL NFR BLD AUTO: 1.2 % (ref 0.3–6.2)
ERYTHROCYTE [DISTWIDTH] IN BLOOD BY AUTOMATED COUNT: 14 % (ref 12.3–15.4)
GLOBULIN UR ELPH-MCNC: 2.9 GM/DL
GLUCOSE SERPL-MCNC: 111 MG/DL (ref 65–99)
HCT VFR BLD AUTO: 41.6 % (ref 37.5–51)
HGB BLD-MCNC: 13.7 G/DL (ref 13–17.7)
IMM GRANULOCYTES # BLD AUTO: 0.05 10*3/MM3 (ref 0–0.05)
IMM GRANULOCYTES NFR BLD AUTO: 0.4 % (ref 0–0.5)
LIPASE SERPL-CCNC: 24 U/L (ref 13–60)
LYMPHOCYTES # BLD AUTO: 1.75 10*3/MM3 (ref 0.7–3.1)
LYMPHOCYTES NFR BLD AUTO: 13.5 % (ref 19.6–45.3)
MCH RBC QN AUTO: 27.1 PG (ref 26.6–33)
MCHC RBC AUTO-ENTMCNC: 32.9 G/DL (ref 31.5–35.7)
MCV RBC AUTO: 82.4 FL (ref 79–97)
MONOCYTES # BLD AUTO: 0.89 10*3/MM3 (ref 0.1–0.9)
MONOCYTES NFR BLD AUTO: 6.8 % (ref 5–12)
NEUTROPHILS NFR BLD AUTO: 10.11 10*3/MM3 (ref 1.7–7)
NEUTROPHILS NFR BLD AUTO: 77.8 % (ref 42.7–76)
NRBC BLD AUTO-RTO: 0 /100 WBC (ref 0–0.2)
NT-PROBNP SERPL-MCNC: 42 PG/ML (ref 0–900)
PLATELET # BLD AUTO: 229 10*3/MM3 (ref 140–450)
PMV BLD AUTO: 8.5 FL (ref 6–12)
POTASSIUM SERPL-SCNC: 3.8 MMOL/L (ref 3.5–5.2)
PROT SERPL-MCNC: 7.5 G/DL (ref 6–8.5)
QT INTERVAL: 391 MS
RBC # BLD AUTO: 5.05 10*6/MM3 (ref 4.14–5.8)
SODIUM SERPL-SCNC: 140 MMOL/L (ref 136–145)
TROPONIN T SERPL-MCNC: <0.01 NG/ML (ref 0–0.03)
WBC NRBC COR # BLD: 13 10*3/MM3 (ref 3.4–10.8)

## 2022-11-11 PROCEDURE — 93005 ELECTROCARDIOGRAM TRACING: CPT | Performed by: EMERGENCY MEDICINE

## 2022-11-11 PROCEDURE — 74174 CTA ABD&PLVS W/CONTRAST: CPT

## 2022-11-11 PROCEDURE — 99285 EMERGENCY DEPT VISIT HI MDM: CPT

## 2022-11-11 PROCEDURE — 71045 X-RAY EXAM CHEST 1 VIEW: CPT

## 2022-11-11 PROCEDURE — 71275 CT ANGIOGRAPHY CHEST: CPT

## 2022-11-11 PROCEDURE — 25010000002 MORPHINE PER 10 MG: Performed by: EMERGENCY MEDICINE

## 2022-11-11 PROCEDURE — 96375 TX/PRO/DX INJ NEW DRUG ADDON: CPT

## 2022-11-11 PROCEDURE — 85025 COMPLETE CBC W/AUTO DIFF WBC: CPT | Performed by: EMERGENCY MEDICINE

## 2022-11-11 PROCEDURE — 93010 ELECTROCARDIOGRAM REPORT: CPT | Performed by: INTERNAL MEDICINE

## 2022-11-11 PROCEDURE — 84484 ASSAY OF TROPONIN QUANT: CPT | Performed by: EMERGENCY MEDICINE

## 2022-11-11 PROCEDURE — 83880 ASSAY OF NATRIURETIC PEPTIDE: CPT | Performed by: EMERGENCY MEDICINE

## 2022-11-11 PROCEDURE — 0 IOPAMIDOL PER 1 ML: Performed by: EMERGENCY MEDICINE

## 2022-11-11 PROCEDURE — 83690 ASSAY OF LIPASE: CPT | Performed by: EMERGENCY MEDICINE

## 2022-11-11 PROCEDURE — 76705 ECHO EXAM OF ABDOMEN: CPT

## 2022-11-11 PROCEDURE — 25010000002 ONDANSETRON PER 1 MG: Performed by: EMERGENCY MEDICINE

## 2022-11-11 PROCEDURE — 80053 COMPREHEN METABOLIC PANEL: CPT | Performed by: EMERGENCY MEDICINE

## 2022-11-11 PROCEDURE — 83036 HEMOGLOBIN GLYCOSYLATED A1C: CPT | Performed by: HOSPITALIST

## 2022-11-11 RX ORDER — SODIUM CHLORIDE 0.9 % (FLUSH) 0.9 %
10 SYRINGE (ML) INJECTION AS NEEDED
Status: DISCONTINUED | OUTPATIENT
Start: 2022-11-11 | End: 2022-11-13 | Stop reason: HOSPADM

## 2022-11-11 RX ORDER — ALUMINA, MAGNESIA, AND SIMETHICONE 2400; 2400; 240 MG/30ML; MG/30ML; MG/30ML
15 SUSPENSION ORAL ONCE
Status: COMPLETED | OUTPATIENT
Start: 2022-11-11 | End: 2022-11-11

## 2022-11-11 RX ORDER — MORPHINE SULFATE 2 MG/ML
4 INJECTION, SOLUTION INTRAMUSCULAR; INTRAVENOUS ONCE
Status: COMPLETED | OUTPATIENT
Start: 2022-11-11 | End: 2022-11-11

## 2022-11-11 RX ORDER — ONDANSETRON 2 MG/ML
4 INJECTION INTRAMUSCULAR; INTRAVENOUS ONCE
Status: COMPLETED | OUTPATIENT
Start: 2022-11-11 | End: 2022-11-11

## 2022-11-11 RX ORDER — LIDOCAINE HYDROCHLORIDE 20 MG/ML
15 SOLUTION OROPHARYNGEAL ONCE
Status: COMPLETED | OUTPATIENT
Start: 2022-11-11 | End: 2022-11-11

## 2022-11-11 RX ADMIN — LIDOCAINE HYDROCHLORIDE 15 ML: 20 SOLUTION ORAL at 21:34

## 2022-11-11 RX ADMIN — ONDANSETRON 4 MG: 2 INJECTION INTRAMUSCULAR; INTRAVENOUS at 20:48

## 2022-11-11 RX ADMIN — IOPAMIDOL 95 ML: 755 INJECTION, SOLUTION INTRAVENOUS at 21:50

## 2022-11-11 RX ADMIN — ALUMINUM HYDROXIDE, MAGNESIUM HYDROXIDE, AND DIMETHICONE 15 ML: 400; 400; 40 SUSPENSION ORAL at 21:34

## 2022-11-11 RX ADMIN — MORPHINE SULFATE 4 MG: 2 INJECTION, SOLUTION INTRAMUSCULAR; INTRAVENOUS at 20:49

## 2022-11-12 ENCOUNTER — ANESTHESIA (OUTPATIENT)
Dept: PERIOP | Facility: HOSPITAL | Age: 57
End: 2022-11-12

## 2022-11-12 ENCOUNTER — APPOINTMENT (OUTPATIENT)
Dept: GENERAL RADIOLOGY | Facility: HOSPITAL | Age: 57
End: 2022-11-12

## 2022-11-12 ENCOUNTER — ANESTHESIA EVENT (OUTPATIENT)
Dept: PERIOP | Facility: HOSPITAL | Age: 57
End: 2022-11-12

## 2022-11-12 PROBLEM — E11.9 DIABETES (HCC): Status: ACTIVE | Noted: 2022-11-12

## 2022-11-12 PROBLEM — I10 HYPERTENSION: Status: ACTIVE | Noted: 2022-11-12

## 2022-11-12 PROBLEM — K81.0 ACUTE CHOLECYSTITIS: Status: ACTIVE | Noted: 2022-11-12

## 2022-11-12 LAB
ANION GAP SERPL CALCULATED.3IONS-SCNC: 10 MMOL/L (ref 5–15)
BACTERIA UR QL AUTO: NORMAL /HPF
BASOPHILS # BLD AUTO: 0.02 10*3/MM3 (ref 0–0.2)
BASOPHILS NFR BLD AUTO: 0.2 % (ref 0–1.5)
BILIRUB UR QL STRIP: NEGATIVE
BUN SERPL-MCNC: 11 MG/DL (ref 6–20)
BUN/CREAT SERPL: 11.6 (ref 7–25)
CALCIUM SPEC-SCNC: 8.4 MG/DL (ref 8.6–10.5)
CHLORIDE SERPL-SCNC: 106 MMOL/L (ref 98–107)
CLARITY UR: CLEAR
CO2 SERPL-SCNC: 22 MMOL/L (ref 22–29)
COLOR UR: ABNORMAL
CREAT SERPL-MCNC: 0.95 MG/DL (ref 0.76–1.27)
DEPRECATED RDW RBC AUTO: 38.1 FL (ref 37–54)
EGFRCR SERPLBLD CKD-EPI 2021: 92.8 ML/MIN/1.73
EOSINOPHIL # BLD AUTO: 0.08 10*3/MM3 (ref 0–0.4)
EOSINOPHIL NFR BLD AUTO: 0.8 % (ref 0.3–6.2)
ERYTHROCYTE [DISTWIDTH] IN BLOOD BY AUTOMATED COUNT: 13.4 % (ref 12.3–15.4)
GLUCOSE BLDC GLUCOMTR-MCNC: 115 MG/DL (ref 70–130)
GLUCOSE BLDC GLUCOMTR-MCNC: 157 MG/DL (ref 70–130)
GLUCOSE BLDC GLUCOMTR-MCNC: 207 MG/DL (ref 70–130)
GLUCOSE SERPL-MCNC: 112 MG/DL (ref 65–99)
GLUCOSE UR STRIP-MCNC: NEGATIVE MG/DL
HBA1C MFR BLD: 4.9 % (ref 4.8–5.6)
HCT VFR BLD AUTO: 33.6 % (ref 37.5–51)
HGB BLD-MCNC: 11.8 G/DL (ref 13–17.7)
HGB UR QL STRIP.AUTO: NEGATIVE
HYALINE CASTS UR QL AUTO: NORMAL /LPF
IMM GRANULOCYTES # BLD AUTO: 0.03 10*3/MM3 (ref 0–0.05)
IMM GRANULOCYTES NFR BLD AUTO: 0.3 % (ref 0–0.5)
KETONES UR QL STRIP: NEGATIVE
LEUKOCYTE ESTERASE UR QL STRIP.AUTO: ABNORMAL
LYMPHOCYTES # BLD AUTO: 1.45 10*3/MM3 (ref 0.7–3.1)
LYMPHOCYTES NFR BLD AUTO: 13.6 % (ref 19.6–45.3)
MCH RBC QN AUTO: 27.3 PG (ref 26.6–33)
MCHC RBC AUTO-ENTMCNC: 35.1 G/DL (ref 31.5–35.7)
MCV RBC AUTO: 77.6 FL (ref 79–97)
MONOCYTES # BLD AUTO: 1.16 10*3/MM3 (ref 0.1–0.9)
MONOCYTES NFR BLD AUTO: 10.9 % (ref 5–12)
NEUTROPHILS NFR BLD AUTO: 7.89 10*3/MM3 (ref 1.7–7)
NEUTROPHILS NFR BLD AUTO: 74.2 % (ref 42.7–76)
NITRITE UR QL STRIP: NEGATIVE
NRBC BLD AUTO-RTO: 0 /100 WBC (ref 0–0.2)
PH UR STRIP.AUTO: 8 [PH] (ref 5–8)
PLATELET # BLD AUTO: 181 10*3/MM3 (ref 140–450)
PMV BLD AUTO: 8.6 FL (ref 6–12)
POTASSIUM SERPL-SCNC: 3.6 MMOL/L (ref 3.5–5.2)
PROT UR QL STRIP: ABNORMAL
RBC # BLD AUTO: 4.33 10*6/MM3 (ref 4.14–5.8)
RBC # UR STRIP: NORMAL /HPF
REF LAB TEST METHOD: NORMAL
SARS-COV-2 RNA RESP QL NAA+PROBE: NOT DETECTED
SODIUM SERPL-SCNC: 138 MMOL/L (ref 136–145)
SP GR UR STRIP: >=1.03 (ref 1–1.03)
SQUAMOUS #/AREA URNS HPF: NORMAL /HPF
UROBILINOGEN UR QL STRIP: ABNORMAL
WBC # UR STRIP: NORMAL /HPF
WBC NRBC COR # BLD: 10.63 10*3/MM3 (ref 3.4–10.8)

## 2022-11-12 PROCEDURE — 74300 X-RAY BILE DUCTS/PANCREAS: CPT

## 2022-11-12 PROCEDURE — 25010000002 MIDAZOLAM PER 1 MG: Performed by: ANESTHESIOLOGY

## 2022-11-12 PROCEDURE — 47563 LAPARO CHOLECYSTECTOMY/GRAPH: CPT | Performed by: SURGERY

## 2022-11-12 PROCEDURE — 25010000002 HYDRALAZINE PER 20 MG: Performed by: NURSE ANESTHETIST, CERTIFIED REGISTERED

## 2022-11-12 PROCEDURE — 25010000002 DEXAMETHASONE SODIUM PHOSPHATE 20 MG/5ML SOLUTION: Performed by: NURSE ANESTHETIST, CERTIFIED REGISTERED

## 2022-11-12 PROCEDURE — 25010000002 HYDROMORPHONE 1 MG/ML SOLUTION: Performed by: NURSE ANESTHETIST, CERTIFIED REGISTERED

## 2022-11-12 PROCEDURE — 96365 THER/PROPH/DIAG IV INF INIT: CPT

## 2022-11-12 PROCEDURE — 88304 TISSUE EXAM BY PATHOLOGIST: CPT | Performed by: SURGERY

## 2022-11-12 PROCEDURE — 25010000002 KETOROLAC TROMETHAMINE PER 15 MG: Performed by: NURSE ANESTHETIST, CERTIFIED REGISTERED

## 2022-11-12 PROCEDURE — 81001 URINALYSIS AUTO W/SCOPE: CPT | Performed by: EMERGENCY MEDICINE

## 2022-11-12 PROCEDURE — 63710000001 INSULIN LISPRO (HUMAN) PER 5 UNITS: Performed by: SURGERY

## 2022-11-12 PROCEDURE — G0378 HOSPITAL OBSERVATION PER HR: HCPCS

## 2022-11-12 PROCEDURE — 47563 LAPARO CHOLECYSTECTOMY/GRAPH: CPT | Performed by: SPECIALIST/TECHNOLOGIST, OTHER

## 2022-11-12 PROCEDURE — 0 IOTHALAMATE 60 % SOLUTION: Performed by: SURGERY

## 2022-11-12 PROCEDURE — 25010000002 FENTANYL CITRATE (PF) 50 MCG/ML SOLUTION: Performed by: NURSE ANESTHETIST, CERTIFIED REGISTERED

## 2022-11-12 PROCEDURE — 99203 OFFICE O/P NEW LOW 30 MIN: CPT | Performed by: SURGERY

## 2022-11-12 PROCEDURE — 25010000002 PIPERACILLIN SOD-TAZOBACTAM PER 1 G: Performed by: NURSE PRACTITIONER

## 2022-11-12 PROCEDURE — 25010000002 PIPERACILLIN SOD-TAZOBACTAM PER 1 G: Performed by: EMERGENCY MEDICINE

## 2022-11-12 PROCEDURE — 80048 BASIC METABOLIC PNL TOTAL CA: CPT | Performed by: HOSPITALIST

## 2022-11-12 PROCEDURE — 25010000002 ONDANSETRON PER 1 MG: Performed by: NURSE ANESTHETIST, CERTIFIED REGISTERED

## 2022-11-12 PROCEDURE — 85025 COMPLETE CBC W/AUTO DIFF WBC: CPT | Performed by: HOSPITALIST

## 2022-11-12 PROCEDURE — 25010000002 MORPHINE PER 10 MG: Performed by: NURSE PRACTITIONER

## 2022-11-12 PROCEDURE — 36415 COLL VENOUS BLD VENIPUNCTURE: CPT | Performed by: HOSPITALIST

## 2022-11-12 PROCEDURE — 96376 TX/PRO/DX INJ SAME DRUG ADON: CPT

## 2022-11-12 PROCEDURE — 82962 GLUCOSE BLOOD TEST: CPT

## 2022-11-12 PROCEDURE — 25010000002 FENTANYL CITRATE (PF) 50 MCG/ML SOLUTION: Performed by: ANESTHESIOLOGY

## 2022-11-12 PROCEDURE — U0003 INFECTIOUS AGENT DETECTION BY NUCLEIC ACID (DNA OR RNA); SEVERE ACUTE RESPIRATORY SYNDROME CORONAVIRUS 2 (SARS-COV-2) (CORONAVIRUS DISEASE [COVID-19]), AMPLIFIED PROBE TECHNIQUE, MAKING USE OF HIGH THROUGHPUT TECHNOLOGIES AS DESCRIBED BY CMS-2020-01-R: HCPCS | Performed by: EMERGENCY MEDICINE

## 2022-11-12 PROCEDURE — 25010000002 NEOSTIGMINE 5 MG/10ML SOLUTION: Performed by: NURSE ANESTHETIST, CERTIFIED REGISTERED

## 2022-11-12 PROCEDURE — 25010000002 PROPOFOL 10 MG/ML EMULSION: Performed by: NURSE ANESTHETIST, CERTIFIED REGISTERED

## 2022-11-12 DEVICE — LARGE LIGATION CLIPS 6 CLIPS/CART
Type: IMPLANTABLE DEVICE | Site: ABDOMEN | Status: FUNCTIONAL
Brand: VAS-Q-CLIP

## 2022-11-12 RX ORDER — ROCURONIUM BROMIDE 10 MG/ML
INJECTION, SOLUTION INTRAVENOUS AS NEEDED
Status: DISCONTINUED | OUTPATIENT
Start: 2022-11-12 | End: 2022-11-12 | Stop reason: SURG

## 2022-11-12 RX ORDER — FLUMAZENIL 0.1 MG/ML
0.2 INJECTION INTRAVENOUS AS NEEDED
Status: DISCONTINUED | OUTPATIENT
Start: 2022-11-12 | End: 2022-11-12 | Stop reason: HOSPADM

## 2022-11-12 RX ORDER — LIDOCAINE HYDROCHLORIDE 20 MG/ML
INJECTION, SOLUTION EPIDURAL; INFILTRATION; INTRACAUDAL; PERINEURAL AS NEEDED
Status: DISCONTINUED | OUTPATIENT
Start: 2022-11-12 | End: 2022-11-12 | Stop reason: SURG

## 2022-11-12 RX ORDER — SODIUM CHLORIDE 0.9 % (FLUSH) 0.9 %
3-10 SYRINGE (ML) INJECTION AS NEEDED
Status: DISCONTINUED | OUTPATIENT
Start: 2022-11-12 | End: 2022-11-12 | Stop reason: HOSPADM

## 2022-11-12 RX ORDER — ONDANSETRON 2 MG/ML
4 INJECTION INTRAMUSCULAR; INTRAVENOUS ONCE AS NEEDED
Status: DISCONTINUED | OUTPATIENT
Start: 2022-11-12 | End: 2022-11-12 | Stop reason: HOSPADM

## 2022-11-12 RX ORDER — HYDROCODONE BITARTRATE AND ACETAMINOPHEN 7.5; 325 MG/1; MG/1
1 TABLET ORAL ONCE AS NEEDED
Status: DISCONTINUED | OUTPATIENT
Start: 2022-11-12 | End: 2022-11-12 | Stop reason: HOSPADM

## 2022-11-12 RX ORDER — AMLODIPINE BESYLATE 10 MG/1
10 TABLET ORAL DAILY
COMMUNITY

## 2022-11-12 RX ORDER — SODIUM CHLORIDE 0.9 % (FLUSH) 0.9 %
10 SYRINGE (ML) INJECTION EVERY 12 HOURS SCHEDULED
Status: DISCONTINUED | OUTPATIENT
Start: 2022-11-12 | End: 2022-11-13 | Stop reason: HOSPADM

## 2022-11-12 RX ORDER — HYDROCODONE BITARTRATE AND ACETAMINOPHEN 7.5; 325 MG/1; MG/1
1 TABLET ORAL EVERY 6 HOURS PRN
Status: DISCONTINUED | OUTPATIENT
Start: 2022-11-12 | End: 2022-11-13 | Stop reason: HOSPADM

## 2022-11-12 RX ORDER — SODIUM CHLORIDE 9 MG/ML
125 INJECTION, SOLUTION INTRAVENOUS CONTINUOUS
Status: DISCONTINUED | OUTPATIENT
Start: 2022-11-12 | End: 2022-11-13 | Stop reason: HOSPADM

## 2022-11-12 RX ORDER — MIDAZOLAM HYDROCHLORIDE 1 MG/ML
1 INJECTION INTRAMUSCULAR; INTRAVENOUS
Status: COMPLETED | OUTPATIENT
Start: 2022-11-12 | End: 2022-11-12

## 2022-11-12 RX ORDER — TAMSULOSIN HYDROCHLORIDE 0.4 MG/1
0.4 CAPSULE ORAL DAILY
Status: DISCONTINUED | OUTPATIENT
Start: 2022-11-12 | End: 2022-11-13 | Stop reason: HOSPADM

## 2022-11-12 RX ORDER — HYDRALAZINE HYDROCHLORIDE 20 MG/ML
5 INJECTION INTRAMUSCULAR; INTRAVENOUS
Status: DISCONTINUED | OUTPATIENT
Start: 2022-11-12 | End: 2022-11-12 | Stop reason: HOSPADM

## 2022-11-12 RX ORDER — MORPHINE SULFATE 2 MG/ML
4 INJECTION, SOLUTION INTRAMUSCULAR; INTRAVENOUS EVERY 4 HOURS PRN
Status: DISCONTINUED | OUTPATIENT
Start: 2022-11-12 | End: 2022-11-13 | Stop reason: HOSPADM

## 2022-11-12 RX ORDER — FENTANYL CITRATE 50 UG/ML
50 INJECTION, SOLUTION INTRAMUSCULAR; INTRAVENOUS
Status: DISCONTINUED | OUTPATIENT
Start: 2022-11-12 | End: 2022-11-12 | Stop reason: HOSPADM

## 2022-11-12 RX ORDER — ACETAMINOPHEN 160 MG/5ML
650 SOLUTION ORAL EVERY 4 HOURS PRN
Status: DISCONTINUED | OUTPATIENT
Start: 2022-11-12 | End: 2022-11-13 | Stop reason: HOSPADM

## 2022-11-12 RX ORDER — MINOXIDIL 10 MG/1
10 TABLET ORAL DAILY
Status: DISCONTINUED | OUTPATIENT
Start: 2022-11-12 | End: 2022-11-13 | Stop reason: HOSPADM

## 2022-11-12 RX ORDER — DEXAMETHASONE SODIUM PHOSPHATE 4 MG/ML
INJECTION, SOLUTION INTRA-ARTICULAR; INTRALESIONAL; INTRAMUSCULAR; INTRAVENOUS; SOFT TISSUE AS NEEDED
Status: DISCONTINUED | OUTPATIENT
Start: 2022-11-12 | End: 2022-11-12 | Stop reason: SURG

## 2022-11-12 RX ORDER — PROMETHAZINE HYDROCHLORIDE 25 MG/1
25 SUPPOSITORY RECTAL ONCE AS NEEDED
Status: DISCONTINUED | OUTPATIENT
Start: 2022-11-12 | End: 2022-11-12 | Stop reason: HOSPADM

## 2022-11-12 RX ORDER — LIDOCAINE HYDROCHLORIDE 10 MG/ML
0.5 INJECTION, SOLUTION EPIDURAL; INFILTRATION; INTRACAUDAL; PERINEURAL ONCE AS NEEDED
Status: DISCONTINUED | OUTPATIENT
Start: 2022-11-12 | End: 2022-11-12 | Stop reason: HOSPADM

## 2022-11-12 RX ORDER — NICOTINE POLACRILEX 4 MG
15 LOZENGE BUCCAL
Status: DISCONTINUED | OUTPATIENT
Start: 2022-11-12 | End: 2022-11-13 | Stop reason: HOSPADM

## 2022-11-12 RX ORDER — INSULIN LISPRO 100 [IU]/ML
0-7 INJECTION, SOLUTION INTRAVENOUS; SUBCUTANEOUS
Status: DISCONTINUED | OUTPATIENT
Start: 2022-11-12 | End: 2022-11-13 | Stop reason: HOSPADM

## 2022-11-12 RX ORDER — SODIUM CHLORIDE, SODIUM LACTATE, POTASSIUM CHLORIDE, CALCIUM CHLORIDE 600; 310; 30; 20 MG/100ML; MG/100ML; MG/100ML; MG/100ML
9 INJECTION, SOLUTION INTRAVENOUS CONTINUOUS
Status: DISCONTINUED | OUTPATIENT
Start: 2022-11-12 | End: 2022-11-13 | Stop reason: HOSPADM

## 2022-11-12 RX ORDER — ONDANSETRON 2 MG/ML
4 INJECTION INTRAMUSCULAR; INTRAVENOUS EVERY 6 HOURS PRN
Status: DISCONTINUED | OUTPATIENT
Start: 2022-11-12 | End: 2022-11-13 | Stop reason: HOSPADM

## 2022-11-12 RX ORDER — NALOXONE HCL 0.4 MG/ML
0.2 VIAL (ML) INJECTION AS NEEDED
Status: DISCONTINUED | OUTPATIENT
Start: 2022-11-12 | End: 2022-11-12 | Stop reason: HOSPADM

## 2022-11-12 RX ORDER — NEOSTIGMINE METHYLSULFATE 0.5 MG/ML
INJECTION, SOLUTION INTRAVENOUS AS NEEDED
Status: DISCONTINUED | OUTPATIENT
Start: 2022-11-12 | End: 2022-11-12 | Stop reason: SURG

## 2022-11-12 RX ORDER — ACETAMINOPHEN 500 MG
1000 TABLET ORAL EVERY 6 HOURS PRN
Status: DISCONTINUED | OUTPATIENT
Start: 2022-11-12 | End: 2022-11-13 | Stop reason: HOSPADM

## 2022-11-12 RX ORDER — MAGNESIUM HYDROXIDE 1200 MG/15ML
LIQUID ORAL AS NEEDED
Status: DISCONTINUED | OUTPATIENT
Start: 2022-11-12 | End: 2022-11-12 | Stop reason: HOSPADM

## 2022-11-12 RX ORDER — EPHEDRINE SULFATE 50 MG/ML
5 INJECTION, SOLUTION INTRAVENOUS ONCE AS NEEDED
Status: DISCONTINUED | OUTPATIENT
Start: 2022-11-12 | End: 2022-11-12 | Stop reason: HOSPADM

## 2022-11-12 RX ORDER — DIPHENHYDRAMINE HCL 25 MG
25 CAPSULE ORAL
Status: DISCONTINUED | OUTPATIENT
Start: 2022-11-12 | End: 2022-11-12 | Stop reason: HOSPADM

## 2022-11-12 RX ORDER — QUETIAPINE FUMARATE 50 MG/1
50 TABLET, FILM COATED ORAL NIGHTLY
COMMUNITY

## 2022-11-12 RX ORDER — SODIUM CHLORIDE 0.9 % (FLUSH) 0.9 %
10 SYRINGE (ML) INJECTION AS NEEDED
Status: DISCONTINUED | OUTPATIENT
Start: 2022-11-12 | End: 2022-11-13 | Stop reason: HOSPADM

## 2022-11-12 RX ORDER — METOPROLOL SUCCINATE 50 MG/1
50 TABLET, EXTENDED RELEASE ORAL DAILY
Status: DISCONTINUED | OUTPATIENT
Start: 2022-11-12 | End: 2022-11-13 | Stop reason: HOSPADM

## 2022-11-12 RX ORDER — QUETIAPINE FUMARATE 50 MG/1
50 TABLET, FILM COATED ORAL NIGHTLY
Status: DISCONTINUED | OUTPATIENT
Start: 2022-11-12 | End: 2022-11-13 | Stop reason: HOSPADM

## 2022-11-12 RX ORDER — ACETAMINOPHEN 325 MG/1
650 TABLET ORAL EVERY 4 HOURS PRN
Status: DISCONTINUED | OUTPATIENT
Start: 2022-11-12 | End: 2022-11-13 | Stop reason: HOSPADM

## 2022-11-12 RX ORDER — PANTOPRAZOLE SODIUM 40 MG/1
40 TABLET, DELAYED RELEASE ORAL EVERY MORNING
Status: DISCONTINUED | OUTPATIENT
Start: 2022-11-12 | End: 2022-11-13 | Stop reason: HOSPADM

## 2022-11-12 RX ORDER — SCOLOPAMINE TRANSDERMAL SYSTEM 1 MG/1
1 PATCH, EXTENDED RELEASE TRANSDERMAL ONCE
Status: DISCONTINUED | OUTPATIENT
Start: 2022-11-12 | End: 2022-11-12

## 2022-11-12 RX ORDER — OXYCODONE AND ACETAMINOPHEN 7.5; 325 MG/1; MG/1
1 TABLET ORAL EVERY 4 HOURS PRN
Status: DISCONTINUED | OUTPATIENT
Start: 2022-11-12 | End: 2022-11-12 | Stop reason: HOSPADM

## 2022-11-12 RX ORDER — PROMETHAZINE HYDROCHLORIDE 25 MG/1
25 TABLET ORAL ONCE AS NEEDED
Status: DISCONTINUED | OUTPATIENT
Start: 2022-11-12 | End: 2022-11-12 | Stop reason: HOSPADM

## 2022-11-12 RX ORDER — ONDANSETRON 4 MG/1
4 TABLET, FILM COATED ORAL EVERY 6 HOURS PRN
Status: DISCONTINUED | OUTPATIENT
Start: 2022-11-12 | End: 2022-11-13 | Stop reason: HOSPADM

## 2022-11-12 RX ORDER — METOPROLOL SUCCINATE 50 MG/1
50 TABLET, EXTENDED RELEASE ORAL DAILY
COMMUNITY

## 2022-11-12 RX ORDER — MINOXIDIL 10 MG/1
10 TABLET ORAL DAILY
COMMUNITY

## 2022-11-12 RX ORDER — LOSARTAN POTASSIUM 50 MG/1
50 TABLET ORAL
Status: DISCONTINUED | OUTPATIENT
Start: 2022-11-12 | End: 2022-11-13 | Stop reason: HOSPADM

## 2022-11-12 RX ORDER — FENTANYL CITRATE 0.05 MG/ML
INJECTION, SOLUTION INTRAMUSCULAR; INTRAVENOUS AS NEEDED
Status: DISCONTINUED | OUTPATIENT
Start: 2022-11-12 | End: 2022-11-12 | Stop reason: SURG

## 2022-11-12 RX ORDER — DIPHENHYDRAMINE HYDROCHLORIDE 50 MG/ML
12.5 INJECTION INTRAMUSCULAR; INTRAVENOUS
Status: DISCONTINUED | OUTPATIENT
Start: 2022-11-12 | End: 2022-11-12 | Stop reason: HOSPADM

## 2022-11-12 RX ORDER — SODIUM CHLORIDE 9 MG/ML
INJECTION, SOLUTION INTRAVENOUS AS NEEDED
Status: DISCONTINUED | OUTPATIENT
Start: 2022-11-12 | End: 2022-11-12 | Stop reason: HOSPADM

## 2022-11-12 RX ORDER — LABETALOL HYDROCHLORIDE 5 MG/ML
5 INJECTION, SOLUTION INTRAVENOUS
Status: DISCONTINUED | OUTPATIENT
Start: 2022-11-12 | End: 2022-11-12 | Stop reason: HOSPADM

## 2022-11-12 RX ORDER — FAMOTIDINE 10 MG/ML
20 INJECTION, SOLUTION INTRAVENOUS ONCE
Status: COMPLETED | OUTPATIENT
Start: 2022-11-12 | End: 2022-11-12

## 2022-11-12 RX ORDER — BUPIVACAINE HYDROCHLORIDE AND EPINEPHRINE 5; 5 MG/ML; UG/ML
INJECTION, SOLUTION EPIDURAL; INTRACAUDAL; PERINEURAL AS NEEDED
Status: DISCONTINUED | OUTPATIENT
Start: 2022-11-12 | End: 2022-11-12 | Stop reason: HOSPADM

## 2022-11-12 RX ORDER — ACETAMINOPHEN 650 MG/1
650 SUPPOSITORY RECTAL EVERY 4 HOURS PRN
Status: DISCONTINUED | OUTPATIENT
Start: 2022-11-12 | End: 2022-11-13 | Stop reason: HOSPADM

## 2022-11-12 RX ORDER — DEXTROSE MONOHYDRATE 25 G/50ML
25 INJECTION, SOLUTION INTRAVENOUS
Status: DISCONTINUED | OUTPATIENT
Start: 2022-11-12 | End: 2022-11-13 | Stop reason: HOSPADM

## 2022-11-12 RX ORDER — NITROGLYCERIN 0.4 MG/1
0.4 TABLET SUBLINGUAL
Status: DISCONTINUED | OUTPATIENT
Start: 2022-11-12 | End: 2022-11-13 | Stop reason: HOSPADM

## 2022-11-12 RX ORDER — SODIUM CHLORIDE 0.9 % (FLUSH) 0.9 %
3 SYRINGE (ML) INJECTION EVERY 12 HOURS SCHEDULED
Status: DISCONTINUED | OUTPATIENT
Start: 2022-11-12 | End: 2022-11-12 | Stop reason: HOSPADM

## 2022-11-12 RX ORDER — AMLODIPINE BESYLATE 10 MG/1
10 TABLET ORAL DAILY
Status: DISCONTINUED | OUTPATIENT
Start: 2022-11-12 | End: 2022-11-13 | Stop reason: HOSPADM

## 2022-11-12 RX ORDER — ONDANSETRON 2 MG/ML
INJECTION INTRAMUSCULAR; INTRAVENOUS AS NEEDED
Status: DISCONTINUED | OUTPATIENT
Start: 2022-11-12 | End: 2022-11-12 | Stop reason: SURG

## 2022-11-12 RX ORDER — KETOROLAC TROMETHAMINE 30 MG/ML
INJECTION, SOLUTION INTRAMUSCULAR; INTRAVENOUS AS NEEDED
Status: DISCONTINUED | OUTPATIENT
Start: 2022-11-12 | End: 2022-11-12 | Stop reason: SURG

## 2022-11-12 RX ORDER — HYDROMORPHONE HYDROCHLORIDE 1 MG/ML
0.5 INJECTION, SOLUTION INTRAMUSCULAR; INTRAVENOUS; SUBCUTANEOUS
Status: DISCONTINUED | OUTPATIENT
Start: 2022-11-12 | End: 2022-11-12 | Stop reason: HOSPADM

## 2022-11-12 RX ORDER — GLYCOPYRROLATE 0.2 MG/ML
INJECTION INTRAMUSCULAR; INTRAVENOUS AS NEEDED
Status: DISCONTINUED | OUTPATIENT
Start: 2022-11-12 | End: 2022-11-12 | Stop reason: SURG

## 2022-11-12 RX ORDER — PROPOFOL 10 MG/ML
VIAL (ML) INTRAVENOUS AS NEEDED
Status: DISCONTINUED | OUTPATIENT
Start: 2022-11-12 | End: 2022-11-12 | Stop reason: SURG

## 2022-11-12 RX ADMIN — FENTANYL CITRATE 50 MCG: 50 INJECTION INTRAMUSCULAR; INTRAVENOUS at 13:48

## 2022-11-12 RX ADMIN — HYDROMORPHONE HYDROCHLORIDE 1 MG: 1 INJECTION, SOLUTION INTRAMUSCULAR; INTRAVENOUS; SUBCUTANEOUS at 12:43

## 2022-11-12 RX ADMIN — TAZOBACTAM SODIUM AND PIPERACILLIN SODIUM 3.38 G: 375; 3 INJECTION, SOLUTION INTRAVENOUS at 00:57

## 2022-11-12 RX ADMIN — TAZOBACTAM SODIUM AND PIPERACILLIN SODIUM 3.38 G: 375; 3 INJECTION, SOLUTION INTRAVENOUS at 08:21

## 2022-11-12 RX ADMIN — SODIUM CHLORIDE 125 ML/HR: 9 INJECTION, SOLUTION INTRAVENOUS at 01:01

## 2022-11-12 RX ADMIN — MIDAZOLAM 1 MG: 1 INJECTION INTRAMUSCULAR; INTRAVENOUS at 12:32

## 2022-11-12 RX ADMIN — MINOXIDIL 10 MG: 10 TABLET ORAL at 20:08

## 2022-11-12 RX ADMIN — LIDOCAINE HYDROCHLORIDE 100 MG: 20 INJECTION, SOLUTION EPIDURAL; INFILTRATION; INTRACAUDAL; PERINEURAL at 12:47

## 2022-11-12 RX ADMIN — SODIUM CHLORIDE, POTASSIUM CHLORIDE, SODIUM LACTATE AND CALCIUM CHLORIDE: 600; 310; 30; 20 INJECTION, SOLUTION INTRAVENOUS at 13:35

## 2022-11-12 RX ADMIN — QUETIAPINE FUMARATE 50 MG: 50 TABLET, FILM COATED ORAL at 20:05

## 2022-11-12 RX ADMIN — DEXAMETHASONE SODIUM PHOSPHATE 8 MG: 4 INJECTION, SOLUTION INTRAMUSCULAR; INTRAVENOUS at 12:54

## 2022-11-12 RX ADMIN — KETOROLAC TROMETHAMINE 30 MG: 30 INJECTION, SOLUTION INTRAMUSCULAR at 13:58

## 2022-11-12 RX ADMIN — ONDANSETRON 4 MG: 2 INJECTION INTRAMUSCULAR; INTRAVENOUS at 13:58

## 2022-11-12 RX ADMIN — SCOPALAMINE 1 PATCH: 1 PATCH, EXTENDED RELEASE TRANSDERMAL at 12:09

## 2022-11-12 RX ADMIN — INSULIN LISPRO 3 UNITS: 100 INJECTION, SOLUTION INTRAVENOUS; SUBCUTANEOUS at 18:19

## 2022-11-12 RX ADMIN — SODIUM CHLORIDE, POTASSIUM CHLORIDE, SODIUM LACTATE AND CALCIUM CHLORIDE 9 ML/HR: 600; 310; 30; 20 INJECTION, SOLUTION INTRAVENOUS at 12:03

## 2022-11-12 RX ADMIN — PROPOFOL 200 MG: 10 INJECTION, EMULSION INTRAVENOUS at 12:47

## 2022-11-12 RX ADMIN — FENTANYL CITRATE 50 MCG: 50 INJECTION INTRAMUSCULAR; INTRAVENOUS at 14:37

## 2022-11-12 RX ADMIN — MIDAZOLAM 1 MG: 1 INJECTION INTRAMUSCULAR; INTRAVENOUS at 12:09

## 2022-11-12 RX ADMIN — FENTANYL CITRATE 50 MCG: 50 INJECTION INTRAMUSCULAR; INTRAVENOUS at 12:10

## 2022-11-12 RX ADMIN — GLYCOPYRROLATE 0.6 MCG: 1 INJECTION INTRAMUSCULAR; INTRAVENOUS at 13:58

## 2022-11-12 RX ADMIN — NEOSTIGMINE METHYLSULFATE 3 MG: 0.5 INJECTION INTRAVENOUS at 13:58

## 2022-11-12 RX ADMIN — FAMOTIDINE 20 MG: 10 INJECTION INTRAVENOUS at 12:09

## 2022-11-12 RX ADMIN — GLYCOPYRROLATE 0.2 MCG: 1 INJECTION INTRAMUSCULAR; INTRAVENOUS at 13:09

## 2022-11-12 RX ADMIN — TAMSULOSIN HYDROCHLORIDE 0.4 MG: 0.4 CAPSULE ORAL at 20:09

## 2022-11-12 RX ADMIN — HYDRALAZINE HYDROCHLORIDE 5 MG: 20 INJECTION INTRAMUSCULAR; INTRAVENOUS at 14:46

## 2022-11-12 RX ADMIN — LABETALOL HYDROCHLORIDE 5 MG: 5 INJECTION, SOLUTION INTRAVENOUS at 14:55

## 2022-11-12 RX ADMIN — ROCURONIUM BROMIDE 40 MG: 10 INJECTION, SOLUTION INTRAVENOUS at 12:47

## 2022-11-12 RX ADMIN — Medication 10 ML: at 20:08

## 2022-11-12 RX ADMIN — FENTANYL CITRATE 50 MCG: 50 INJECTION INTRAMUSCULAR; INTRAVENOUS at 14:02

## 2022-11-12 RX ADMIN — MORPHINE SULFATE 4 MG: 2 INJECTION, SOLUTION INTRAMUSCULAR; INTRAVENOUS at 08:21

## 2022-11-12 RX ADMIN — LINAGLIPTIN 5 MG: 5 TABLET, FILM COATED ORAL at 20:08

## 2022-11-12 RX ADMIN — Medication 10 ML: at 20:05

## 2022-11-12 NOTE — OP NOTE
OPERATIVE REPORT     Fady Diane  1965    Procedure Date: 11/12/22    Pre-op Diagnosis:  Acute cholecystitis    Post-op Diagnosis:  Post-Op Diagnosis Codes:  Same    Procedure:   Laparoscopic Cholecystectomy with Intraoperative Cholangiogram    Surgeon: Elizabeth Pritchard MD    Assistant: Marina Israel (she was responsible for laparoscopic manipulation of the gallbladder during critical portions of the dissection, handling of the laparoscopic camera, closure of all surgical incisions, and application of topical sterile dressings)    Anesthesia: General    Specimen: Gallbladder    Complications: None    Estimated Blood Loss: minimal    Indications for Operation: Fady Diane is a 57 y.o. year old male who presented to the ER with epigastric and right upper quadrant pain and was found to have acute cholecystitis.  I recommended proceeding with laparoscopic cholecystectomy with IOC.  All risks (including bleeding, infection, damage to surrounding structures), benefits, and alternatives were explained to the patient and he agreed and wished to proceed.  Informed consent was obtained.    Description of Procedure: The patient was taken to the operating room, transferred onto the operating room table, and underwent general endotracheal anesthesia without incident. The patient was prepped and draped in the usual sterile fashion.  Preoperative antibiotics were given, and a timeout was performed.  Half percent Marcaine with epinephrine was injected into the skin and subcutaneous tissues.  Incision was made superior to the umbilicus, and the Veress needle was used to initiate pneumoperitoneum.  An Optiview trocar with a 5 30 scope was inserted through each layer of the abdominal wall individually and into the abdomen.  The the area under insertion was inspected and no injuries were noted.  Two 5 mm ports were placed in the right upper and right lateral abdomen under direct visualization.  An 11 mm subxiphoid trocar was placed.   First the gallbladder was decompressed using a laparoscopic needle.  Dark bile was removed from the gallbladder.  The gallbladder was retracted cranially and laterally to allow for adequate visualization.  The area around the infundibulum was dissected until the cystic duct and artery were identified. The critical view was obtained.     A cholangiogram was done by placing a clip on the cystic duct and incising it just distal to this.  A cholangiogram catheter was introduced with an Angiocath needle and directed into the cystic duct.  A clip was applied.  With fluoroscopy contrast was injected and confirmed the anatomy and that there were no stones present.  Contrast was seen going into the right and left hepatic ducts as well as the duodenum.  The cholangiogram catheter was then removed.  The cystic duct had 2 clips placed on the bile duct side and was transected.  The same was done for the cystic artery.  Bovie electrocautery was then used to remove the gallbladder from the liver bed.  The gallbladder was placed into a bag.  The area was then irrigated and inspected for hemostasis.  There was no bleeding or bile noted.  The gallbladder was then removed through the subxiphoid port.  The ports were removed under direct visualization. The fascia of the subxiphoid port was closed with 0 Vicryl suture. The incisions were then closed with 4-0 Monocryl sutures and Dermabond.  All needle and lap counts were correct at the end of the case.  The patient was awoken from general endotracheal anesthesia and taken to the recovery area for further monitoring.    Recommendations:  -Resume diet.  Start with clears and advance as tolerated  -May discharge home today if feeling well postoperatively.  Discharge instructions placed in chart.   -Follow-up with me in 2 weeks.  Call on Monday to schedule appointment.    REGGIE FRANK M.D.  General, Robotic, and Endoscopic Surgery  [facility]    [address]  [place], [place], [address]  P: [phone]  F: [phone]      right foot/ ankle splinted and elevated.

## 2022-11-12 NOTE — ANESTHESIA PROCEDURE NOTES
Airway  Urgency: elective    Date/Time: 11/12/2022 12:51 PM  Airway not difficult    General Information and Staff    Patient location during procedure: OR  Anesthesiologist: Kanu Jurado MD  CRNA/CAA: Ernst Bustos CRNA    Indications and Patient Condition  Indications for airway management: airway protection    Preoxygenated: yes  MILS maintained throughout  Mask difficulty assessment: 2 - vent by mask + OA or adjuvant +/- NMBA    Final Airway Details  Final airway type: endotracheal airway      Successful airway: ETT  Cuffed: yes   Successful intubation technique: direct laryngoscopy  Facilitating devices/methods: Bougie  Endotracheal tube insertion site: oral  Blade: Cantor  Blade size: 2  ETT size (mm): 7.5  Cormack-Lehane Classification: grade IIa - partial view of glottis  Placement verified by: chest auscultation and capnometry   Cuff volume (mL): 8  Measured from: lips  ETT/EBT  to lips (cm): 23  Number of attempts at approach: 1  Assessment: lips, teeth, and gum same as pre-op and atraumatic intubation

## 2022-11-12 NOTE — ANESTHESIA PREPROCEDURE EVALUATION
Anesthesia Evaluation     Patient summary reviewed and Nursing notes reviewed   history of anesthetic complications: PONV  NPO Solid Status: > 8 hours  NPO Liquid Status: > 2 hours           Airway   Mallampati: II  TM distance: >3 FB  Neck ROM: full  no difficulty expected  Dental - normal exam     Pulmonary - negative pulmonary ROS and normal exam   (-) COPD, asthma, not a smoker, lung cancer  Cardiovascular - normal exam  Exercise tolerance: good (4-7 METS)    ECG reviewed  Rhythm: regular  Rate: normal    (+) hypertension poorly controlled 2 medications or greater,   (-) valvular problems/murmurs, past MI, CAD, dysrhythmias, angina, CHF, cardiac stents, CABG, pericardial effusion      Neuro/Psych- negative ROS  (-) seizures, TIA, CVA  GI/Hepatic/Renal/Endo    (+) obesity,  GERD well controlled,  diabetes mellitus type 2 well controlled,   (-) PUD, hepatitis, liver disease, no renal disease, GI bleed    Musculoskeletal (-) negative ROS    Abdominal  - normal exam   Substance History - negative use     OB/GYN          Other - negative ROS                       Anesthesia Plan    ASA 3     general     intravenous induction     Anesthetic plan, risks, benefits, and alternatives have been provided, discussed and informed consent has been obtained with: patient.    Plan discussed with CRNA.        CODE STATUS:    Code Status (Patient has no pulse and is not breathing): CPR (Attempt to Resuscitate)  Medical Interventions (Patient has pulse or is breathing): Full Support

## 2022-11-12 NOTE — ED PROVIDER NOTES
EMERGENCY DEPARTMENT ENCOUNTER    Room Number:  18/18  Date seen:  11/12/2022  PCP: Colby Escobar MD  Historian: Patient      HPI:  Chief Complaint: Abdominal pain  A complete HPI/ROS/PMH/PSH/SH/FH are unobtainable due to: Nothing  Context: Fady Diane is a 57 y.o. male who presents to the ED c/o upper abdominal pain that started abruptly this evening.  History is inconsistent.  EMS reports that patient had associated nausea and vomiting.  Patient denies vomiting to me.  Patient also told the bedside nurse that he felt short of breath but he denies shortness of breath to me.  The pain is nonradiating.  It has been partially alleviated by morphine here.  He reports that he had the same symptoms yesterday morning upon waking up at 5 AM and it lasted few minutes and then resolved.  He went the rest the day without having any symptoms.  He denies fever or chills.  He had a bowel movement 2 days ago.  He denies black or bloody stool.  He reports history of GERD for which he takes medication.  He also has a history of high blood pressure and kidney stones.  He does not believe that this is related to his heart.  He believes it is something more GI in nature.            PAST MEDICAL HISTORY  Active Ambulatory Problems     Diagnosis Date Noted   • No Active Ambulatory Problems     Resolved Ambulatory Problems     Diagnosis Date Noted   • No Resolved Ambulatory Problems     Past Medical History:   Diagnosis Date   • Diabetes mellitus (HCC)    • GERD (gastroesophageal reflux disease)    • Hypertension          PAST SURGICAL HISTORY  Past Surgical History:   Procedure Laterality Date   • CERVICAL DISC SURGERY  2014    cervical fusion and hardware   • ELBOW PROCEDURE Left     cubical tunnel repair   • HERNIA REPAIR      double hernia repair 15 years ago   • INNER EAR SURGERY Bilateral 2011    reconstructed ear canals and ear drums   • KIDNEY STONE SURGERY      5 years ago   • NASAL SEPTUM SURGERY  2009         FAMILY  HISTORY  History reviewed. No pertinent family history.      SOCIAL HISTORY  Social History     Socioeconomic History   • Marital status:    Tobacco Use   • Smoking status: Never   Substance and Sexual Activity   • Alcohol use: Yes     Comment: socially   • Drug use: Never   • Sexual activity: Defer         ALLERGIES  Patient has no known allergies.        REVIEW OF SYSTEMS  Review of Systems   Review of all 14 systems is negative other than stated in the HPI above.      PHYSICAL EXAM  ED Triage Vitals [11/11/22 2018]   Temp Heart Rate Resp BP SpO2   97.9 °F (36.6 °C) 78 18 (!) 183/101 99 %      Temp src Heart Rate Source Patient Position BP Location FiO2 (%)   Tympanic Monitor Sitting Right arm --         GENERAL: Awake and alert, no acute distress  HENT: nares patent  EYES: no scleral icterus, EOMI  CV: regular rhythm, normal rate  RESPIRATORY: normal effort, lungs clear bilaterally  ABDOMEN: soft, mid epigastric and subxiphoid tenderness without rebound or guarding, no other abdominal tenderness, abdomen nondistended  MUSCULOSKELETAL: no deformity, no peripheral edema, no calf tenderness bilaterally  NEURO: alert, moves all extremities, follows commands, cranial nerves II through XII grossly intact, speech fluent and clear  PSYCH:  calm, cooperative  SKIN: warm, dry    Vital signs and nursing notes reviewed.          LAB RESULTS  Recent Results (from the past 24 hour(s))   Comprehensive Metabolic Panel    Collection Time: 11/11/22  8:51 PM    Specimen: Blood   Result Value Ref Range    Glucose 111 (H) 65 - 99 mg/dL    BUN 13 6 - 20 mg/dL    Creatinine 1.09 0.76 - 1.27 mg/dL    Sodium 140 136 - 145 mmol/L    Potassium 3.8 3.5 - 5.2 mmol/L    Chloride 102 98 - 107 mmol/L    CO2 22.4 22.0 - 29.0 mmol/L    Calcium 9.6 8.6 - 10.5 mg/dL    Total Protein 7.5 6.0 - 8.5 g/dL    Albumin 4.60 3.50 - 5.20 g/dL    ALT (SGPT) 23 1 - 41 U/L    AST (SGOT) 32 1 - 40 U/L    Alkaline Phosphatase 82 39 - 117 U/L    Total  Bilirubin 1.3 (H) 0.0 - 1.2 mg/dL    Globulin 2.9 gm/dL    A/G Ratio 1.6 g/dL    BUN/Creatinine Ratio 11.9 7.0 - 25.0    Anion Gap 15.6 (H) 5.0 - 15.0 mmol/L    eGFR 78.7 >60.0 mL/min/1.73   Lipase    Collection Time: 11/11/22  8:51 PM    Specimen: Blood   Result Value Ref Range    Lipase 24 13 - 60 U/L   BNP    Collection Time: 11/11/22  8:51 PM    Specimen: Blood   Result Value Ref Range    proBNP 42.0 0.0 - 900.0 pg/mL   Troponin    Collection Time: 11/11/22  8:51 PM    Specimen: Blood   Result Value Ref Range    Troponin T <0.010 0.000 - 0.030 ng/mL   CBC Auto Differential    Collection Time: 11/11/22  8:51 PM    Specimen: Blood   Result Value Ref Range    WBC 13.00 (H) 3.40 - 10.80 10*3/mm3    RBC 5.05 4.14 - 5.80 10*6/mm3    Hemoglobin 13.7 13.0 - 17.7 g/dL    Hematocrit 41.6 37.5 - 51.0 %    MCV 82.4 79.0 - 97.0 fL    MCH 27.1 26.6 - 33.0 pg    MCHC 32.9 31.5 - 35.7 g/dL    RDW 14.0 12.3 - 15.4 %    RDW-SD 41.7 37.0 - 54.0 fl    MPV 8.5 6.0 - 12.0 fL    Platelets 229 140 - 450 10*3/mm3    Neutrophil % 77.8 (H) 42.7 - 76.0 %    Lymphocyte % 13.5 (L) 19.6 - 45.3 %    Monocyte % 6.8 5.0 - 12.0 %    Eosinophil % 1.2 0.3 - 6.2 %    Basophil % 0.3 0.0 - 1.5 %    Immature Grans % 0.4 0.0 - 0.5 %    Neutrophils, Absolute 10.11 (H) 1.70 - 7.00 10*3/mm3    Lymphocytes, Absolute 1.75 0.70 - 3.10 10*3/mm3    Monocytes, Absolute 0.89 0.10 - 0.90 10*3/mm3    Eosinophils, Absolute 0.16 0.00 - 0.40 10*3/mm3    Basophils, Absolute 0.04 0.00 - 0.20 10*3/mm3    Immature Grans, Absolute 0.05 0.00 - 0.05 10*3/mm3    nRBC 0.0 0.0 - 0.2 /100 WBC   ECG 12 Lead Dyspnea    Collection Time: 11/11/22  9:34 PM   Result Value Ref Range    QT Interval 391 ms   Urinalysis With Microscopic If Indicated (No Culture) - Urine, Clean Catch    Collection Time: 11/12/22  1:46 AM    Specimen: Urine, Clean Catch   Result Value Ref Range    Color, UA Dark Yellow (A) Yellow, Straw    Appearance, UA Clear Clear    pH, UA 8.0 5.0 - 8.0    Specific  Gravity, UA >=1.030 1.005 - 1.030    Glucose, UA Negative Negative    Ketones, UA Negative Negative    Bilirubin, UA Negative Negative    Blood, UA Negative Negative    Protein, UA Trace (A) Negative    Leuk Esterase, UA Trace (A) Negative    Nitrite, UA Negative Negative    Urobilinogen, UA 2.0 E.U./dL (A) 0.2 - 1.0 E.U./dL   Urinalysis, Microscopic Only - Urine, Clean Catch    Collection Time: 11/12/22  1:46 AM    Specimen: Urine, Clean Catch   Result Value Ref Range    RBC, UA 0-2 None Seen, 0-2 /HPF    WBC, UA 0-2 None Seen, 0-2 /HPF    Bacteria, UA None Seen None Seen /HPF    Squamous Epithelial Cells, UA 0-2 None Seen, 0-2 /HPF    Hyaline Casts, UA 0-2 None Seen /LPF    Methodology Automated Microscopy        Ordered the above labs and reviewed the results.        RADIOLOGY  US Gallbladder    Result Date: 11/12/2022  GALLBLADDER ULTRASOUND  HISTORY: Nausea and vomiting  COMPARISON: 11/11/2022  TECHNIQUE: Grayscale and color Doppler sonographic images were obtained through the right upper quadrant.  FINDINGS: Pancreas is poorly visualized. It appeared normal on the patient's earlier CT. The liver does appear mildly steatotic. No focal hepatic lesions are seen. There is no intrahepatic biliary dilatation. Common bile duct measures up to 6 mm. This is at the upper limits of normal. It does taper to normal caliber distally. No definite choledocholithiasis was identified on the earlier CT. The gallbladder contains stones, and is thick-walled, with gallbladder wall measuring up to 5 mm. There is also some trace pericholecystic fluid. Appearance is concerning for acute cholecystitis. There is also a suggestion of some adenomyomatosis. The right kidney measures 12.5 x 4.6 x 5.7 cm. No hydronephrosis is seen. Renal echotexture is normal.       1. Distended gallbladder, which contains stones, and demonstrates wall thickening and trace pericholecystic fluid. Appearance is suspicious for acute cholecystitis. 2. The  proximal common duct appears mildly prominent, although it does taper to normal caliber. No obvious choledocholithiasis is seen on the patient's earlier CT.  This report was finalized on 11/12/2022 12:22 AM by Dr. Yenifer Box M.D.      XR Chest 1 View    Result Date: 11/11/2022  SINGLE VIEW OF THE CHEST  HISTORY: Shortness of air  COMPARISON: None available.  FINDINGS: Cardiomegaly is present. There is no vascular congestion. No pneumothorax is seen. There is blunting of the left costophrenic angle, although it appears to be chronic. No acute infiltrates are identified.      No acute findings.  This report was finalized on 11/11/2022 9:07 PM by Dr. Yenifer Box M.D.      CT Angiogram Chest, CT Angiogram Abdomen Pelvis    Result Date: 11/11/2022  CT ANGIOGRAM OF THE CHEST, ABDOMEN, AND PELVIS  HISTORY: Epigastric pain and hypertension  COMPARISON: 05/14/2020  TECHNIQUE: Axial CT imaging was obtained through the thorax. IV contrast was administered. Three-D reformatted images were obtained.  FINDINGS: CT CHEST: Exam is limited by timing of the contrast bolus. The thoracic aorta is normal in caliber. There is no evidence of dissection. No obvious acute pulmonary thromboembolus is seen. There is some calcification of the thoracic aorta and coronary arteries. Mediastinal and hilar lymph nodes do not appear pathologically enlarged. The patient's distal esophagus appear somewhat thick-walled and edematous. Appearance may reflect esophagitis. Thyroid gland and trachea appear normal. There is some mild dependent atelectasis. No acute osseous abnormalities are seen.  CT ABDOMEN AND PELVIS. Patient's stomach does appear to be distended with fluid and debris. The gallbladder contains gallstones. There is a suggestion of some gallbladder wall edema. Further assessment with gallbladder ultrasound is recommended. Abdominal aorta is normal in caliber. There is no evidence of dissection. There is some mild calcification  of the aorta. The celiac axis, and superior mesenteric artery are widely patent. No flow-limiting stenosis of the renal arteries is seen, and the inferior mesenteric artery is patent. Patient does have some mild calcified plaque within the right common iliac artery. There is no hemodynamically significant stenosis. Patient appears to have some tiny cysts within the liver. Liver is suspected to be mildly steatotic. There is borderline splenomegaly, with spleen measuring 13.9 cm in craniocaudal dimensions. Duodenum is normal. There are some prominent upper abdominal lymph nodes. For example, a node within the gastrohepatic ligament measures up to 1.6 cm in short axis dimensions. This is slightly larger than on prior exam, when measures 1.4 cm. The kidneys enhance symmetrically. There is no hydronephrosis. Nonobstructing stones are noted within the right kidney, with the largest measuring up to 4 mm. There is cortical thinning are seen on both kidneys, suggesting the sequela of prior insults. There are simple appearing bilateral renal cysts. No distal ureteral or bladder stones are seen. Prostate gland and urinary bladder appear normal. There is colonic diverticulosis. Is no bowel obstruction. Appendix is normal. There is a fat-containing umbilical hernia. No acute osseous abnormalities are seen.       1. No evidence of aortic dissection. 2. Patient's distal esophagus appears mildly thick walled and edematous, and the stomach is also distended with fluid and debris. Appearance may reflect nonspecific gastroesophagitis. 3. There are multiple stones identified within the gallbladder, and there is a suggestion of some gallbladder wall edema. Further assessment with gallbladder ultrasound is recommended.  Radiation dose reduction techniques were utilized, including automated exposure control and exposure modulation based on body size.  This report was finalized on 11/11/2022 11:00 PM by Dr. Yenifer Box M.D.         Ordered the above noted radiological studies. Reviewed by me in PACS.            PROCEDURES  Procedures              MEDICATIONS GIVEN IN ER  Medications   sodium chloride 0.9 % flush 10 mL (has no administration in time range)   sodium chloride 0.9 % infusion (125 mL/hr Intravenous New Bag 11/12/22 0101)   sodium chloride 0.9 % flush 10 mL (has no administration in time range)   sodium chloride 0.9 % flush 10 mL (has no administration in time range)   nitroglycerin (NITROSTAT) SL tablet 0.4 mg (has no administration in time range)   morphine injection 4 mg (has no administration in time range)   piperacillin-tazobactam (ZOSYN) 3.375 g in iso-osmotic dextrose 50 ml (premix) (has no administration in time range)   morphine injection 4 mg (4 mg Intravenous Given 11/11/22 2049)   ondansetron (ZOFRAN) injection 4 mg (4 mg Intravenous Given 11/11/22 2048)   aluminum-magnesium hydroxide-simethicone (MAALOX MAX) 400-400-40 MG/5ML suspension 15 mL (15 mL Oral Given 11/11/22 2134)   Lidocaine Viscous HCl (XYLOCAINE) 2 % solution 15 mL (15 mL Mouth/Throat Given 11/11/22 2134)   iopamidol (ISOVUE-370) 76 % injection 95 mL (95 mL Intravenous Given by Other 11/11/22 2150)   piperacillin-tazobactam (ZOSYN) 3.375 g in iso-osmotic dextrose 50 ml (premix) (0 g Intravenous Stopped 11/12/22 0132)                   MEDICAL DECISION MAKING, PROGRESS, and CONSULTS    All labs have been independently reviewed by me.  All radiology studies have been reviewed by me and discussed with radiologist dictating the report.   EKG's independently viewed and interpreted by me.  Discussion below represents my analysis of pertinent findings related to patient's condition, differential diagnosis, treatment plan and final disposition.      Differential diagnosis:  My differential diagnosis for abdominal pain includes but is not limited to:  Gastritis, gastroenteritis, peptic ulcer disease, GERD, esophageal perforation, acute appendicitis,  mesenteric adenitis, Meckel’s diverticulum, epiploic appendagitis, diverticulitis, colon cancer, ulcerative colitis, Crohn’s disease, intussusception, small bowel obstruction, adhesions, ischemic bowel, perforated viscus, ileus, obstipation, biliary colic, cholecystitis, cholelithiasis, Kaz-Barrera Blaine, hepatitis, pancreatitis, common bile duct obstruction, cholangitis, bile leak, splenic trauma, splenic rupture, splenic infarction, splenic abscess, abdominal abscess, ascites, spontaneous bacterial peritonitis, hernia, UTI, cystitis, prostatitis, ureterolithiasis, urinary obstruction, ovarian cyst, torsion, pregnancy, ectopic pregnancy, PID, pelvic abscess, mittelschmerz, endometriosis, AAA, myocardial infarction, pneumonia, cancer, porphyria, DKA, medications, sickle cell, viral syndrome, zoster      ED Course as of 11/12/22 0325   Fri Nov 11, 2022   2246 EKG interpreted by me:  Time: 2134  Rate and rhythm: Sinus rhythm, 80  KS: Normal  QRS: Borderline left axis  ST and T waves: T wave inversion in lead III [JR]   2345 Troponin T: <0.010 [JR]   2345 proBNP: 42.0 [JR]   2345 Lipase: 24 [JR]   2345 Glucose(!): 111 [JR]   2345 ALT (SGPT): 23 [JR]   2345 AST (SGOT): 32 [JR]   2354 Patient reassessed.  He reports feeling better at this time.  I discussed plan to obtain gallbladder ultrasound for further evaluation of gallstones seen on CT. [JR]   Sat Nov 12, 2022   0009 The ultrasound tech reports the patient has gallbladder wall thickening, pericholecystic fluid, and cholelithiasis. [JR]   0020 Discussed with Dr. Shelton, general surgery.  She requested Zosyn, patient to remain n.p.o. for cholecystectomy tomorrow.  She requested admission to hospitalist due to his elevated blood pressure readings, diabetes. [JR]   0213 Discussed with RODDY Canales for A, who agrees to admit to telemetry observation bed. [JR]      ED Course User Index  [JR] Lazaro [facility] MD              I wore an N95 mask, face shield, and  gloves during this patient encounter.  Patient also wearing a surgical mask.  Hand hygeine performed before and after seeing the patient.    DIAGNOSIS  Final diagnoses:   Acute cholecystitis   Hypertension not at goal         DISPOSITION  ADMIT            Latest Documented Vital Signs:  As of 03:25 EST  BP- (!) 181/97 HR- 91 Temp- 99.5 °F (37.5 °C) O2 sat- 92%        --    Please note that portions of this were completed with a voice recognition program.          Lazaro [facility] MD  11/12/22 0323

## 2022-11-12 NOTE — ED TRIAGE NOTES
Pt to ED via [facility] from home. PT c/o epigastric abdominal pain that started last night after eating dinner and increased in the intensity after eatign this evening. Pt denies cholecystectomy. Pt denies diarrhea but endorses vomiting. EMS gave pt 1 SL NTG and 1 325 ASA with no relief. Pt appears in moderate distress at time of triage and hypertensive for EMS. EMS reports they tried nitrous in route with no change in pt's pain level.     This RN in appropriate PPE for all patient care interactions. Pt masked and redirected for proper mask use when necessary. Hand hygiene performed before and after all patient care interactions.

## 2022-11-12 NOTE — CONSULTS
General Surgery Consultation      Reason for consultation: Acute cholecystitis    CC: Abdominal pain    HPI:   The patient is a very pleasant 57 y.o. male who presented to the hospital with right upper quadrant abdominal pain that started yesterday in the evening.  He reports nausea but denies vomiting.  He reports having similar pain the day prior early in the morning, but the pain resolved and he went about his day without other symptoms.  He denies fevers or chills.  Reports pain is sharp and 10/10.    Past Medical History:  Diabetes  GERD  Hypertension      Past Surgical History:    Past Surgical History:   Procedure Laterality Date   • CERVICAL DISC SURGERY  2014    cervical fusion and hardware   • ELBOW PROCEDURE Left     cubical tunnel repair   • HERNIA REPAIR      double hernia repair 15 years ago   • INNER EAR SURGERY Bilateral 2011    reconstructed ear canals and ear drums   • KIDNEY STONE SURGERY      5 years ago   • NASAL SEPTUM SURGERY  2009       Medications:  (Not in a hospital admission)      Allergies: No Known Allergies    Social History:   Denies smoking  Occasional alcohol use    Family History:   History reviewed. No pertinent family history.    Review of Systems:  Constitutional: denies any weight changes, fatigue, or weakness  Eyes: denies blurred/double vision or scleral icterus  Cardiovascular: denies chest pain, palpitations, or edemas  Respiratory: denies cough, sputum, or dyspnea  Gastrointestinal: Reports abdominal pain, nausea, denies diarrhea, constipation  Genitourinary: denies dysuria or hematuria  Endocrine: denies cold intolerance, lethargy, or flushing  Hematologic: denies excessive bruising or bleeding  Musculoskeletal: denies weakness, joint swelling, joint pain, or stiffness  Neurologic: denies seizures, CVA, paresthesia, or peripheral neuropathy  Skin: denies change in nevi, rashes, masses, or jaundice     All other systems reviewed and were negative.    Physical Exam:    Vitals:    11/12/22 0104   BP:    Pulse:    Resp:    Temp: 99.1 °F (37.3 °C)   SpO2:      Height: 77 inches  Weight: 113 kg  BMI: 29.6  GENERAL: awake and alert, no acute distress, oriented to person, place, and time  HEENT: normocephalic, atraumatic, no scleral icterus, moist mucous membranes  NECK: Supple, there is no thyromegaly or lymphadenopathy  RESPIRATORY: clear to auscultation, no wheezes, rales or rhonchi, symmetric air entry  CARDIOVASCULAR: regular rate and rhythm    GASTROINTESTINAL: Soft, nondistended, tender to palpation right upper quadrant, no rebound or guarding  MUSCULOSKELETAL: no cyanosis, clubbing, or edema   NEUROLOGIC: alert and oriented, normal speech, cranial nerves 2-12 grossly intact, no focal deficits   SKIN: Moist, warm, no rashes, no jaundice      Diagnostic work-up:     Pertinent labs:   Results from last 7 days   Lab Units 11/11/22 2051   WBC 10*3/mm3 13.00*   HEMOGLOBIN g/dL 13.7   HEMATOCRIT % 41.6   PLATELETS 10*3/mm3 229     Results from last 7 days   Lab Units 11/11/22 2051   SODIUM mmol/L 140   POTASSIUM mmol/L 3.8   CHLORIDE mmol/L 102   CO2 mmol/L 22.4   BUN mg/dL 13   CREATININE mg/dL 1.09   CALCIUM mg/dL 9.6   BILIRUBIN mg/dL 1.3*   ALK PHOS U/L 82   ALT (SGPT) U/L 23   AST (SGOT) U/L 32   GLUCOSE mg/dL 111*       Imaging:  CT abdomen and right upper quadrant ultrasound show distended gallbladder with wall thickening and pericholecystic fluid, consistent with acute cholecystitis.    Assessment and plan:   The patient is a 57 y.o. male with acute cholecystitis    Plan for laparoscopic cholecystectomy with IOC today.  All risks (bleeding, infection, injury to surrounding structures including common bile duct, liver, intestines), benefits, and alternatives were discussed with the patient and he wishes to proceed.      Elizabeth Pritchard MD  General, Robotic, and Endoscopic Surgery  [facility]     [address], [address]  [place], [place], [address]  P:  [phone]  F: [phone]

## 2022-11-12 NOTE — DISCHARGE INSTRUCTIONS
Dr. Elizabeth Pritchard  [address]  [place] [address]  [phone]    Discharge Instructions: Gall Bladder Surgery    Go home, rest and take it easy today; however, you should get up and move about several times today to reduce the risk of developing a blood clot in your legs.   You may experience some dizziness or memory loss from the anesthesia. This may last for the next 24 hours. Someone should plan on staying with you for the first 24 hours for your safety.   Do not make any important legal decisions or sign any legal papers for the next 24 hours.   Eat and drink lightly today. Start off with liquids, jello, soup, crackers or other bland foods at first. You may advance your diet tomorrow as tolerated as long as you do not experience any nausea or vomiting.   Your incisions are covered with skin glue. They will fall off on their own in 1-2 weeks. Do not worry if they come off sooner.   You may notice some bleeding/drainage on your outer dressings. A little bloody drainage is normal. If the bleeding/drainage is such that the bandage cannot absorb it, remove the dressing, apply clean gauze and apply firm pressure for a full 15 minutes. If the bleeding continues, please call me.   You may shower 24hr after surgery. No tub baths until your incisions are completely healed.   You have received a prescription for a narcotic pain medicine, as you will have some pain following surgery.  You will not be totally pain free, but your pain medicine should make the pain tolerable. Please take your pain medicine as prescribed and always take your pills with food to prevent nausea. If you are having severe pain that cannot be controlled by the pain medicine, please contact me.   Narcotic pain medicine can cause constipation. Take an over the counter stool softener, like Miaralax or docusate to prevent constipation while taking narcotics.  You have also received a prescription for an anti-nausea medicine. Please  take this as prescribed for any nausea or vomiting. Nausea could be a result of the anesthesia or a result of the narcotic pain medicine. If you experience severe nausea and vomiting that cannot be controlled by the nausea medicine, please call me.   It is not unusual to experience pain/discomfort in your shoulders or your ribs after surgery. It is from the gas used during the laparoscopic procedure and usually lasts 1-3 days. The prescription pain medicine is used to treat the surgical pain and does not typically alleviate this “gassy” pain.   No driving for 24 hours and for as long as you are taking your prescription pain medicine.   You will need to call the office at [phone] to schedule a follow-up appointment in 2 weeks.   Remember to contact me for any of the following:   Fever > 101 degrees  Severe pain that cannot be controlled by taking your pain pills  Severe nausea or vomiting that cannot be controlled by taking your nausea pills  Significant bleeding of your incisions  Drainage that has a bad smell or is yellow or green in appearance  Any other questions or concerns

## 2022-11-12 NOTE — ED NOTES
Nursing report ED to floor  Fady Diane  57 y.o.  male    HPI :   Chief Complaint   Patient presents with    Abdominal Pain       Admitting doctor:   Colby Swan MD    Admitting diagnosis:   The primary encounter diagnosis was Acute cholecystitis. A diagnosis of Hypertension not at goal was also pertinent to this visit.    Code status:   Current Code Status       Date Active Code Status Order ID Comments User Context       11/12/2022 0212 CPR (Attempt to Resuscitate) [id number]  Mary Chatterjee APRN ED        Question Answer    Code Status (Patient has no pulse and is not breathing) CPR (Attempt to Resuscitate)    Medical Interventions (Patient has pulse or is breathing) Full Support                    Allergies:   Patient has no known allergies.    Isolation:   No active isolations    Intake and Output  No intake or output data in the 24 hours ending 11/12/22 0306    Weight:       11/11/22 2043   Weight: 113 kg (250 lb)       Most recent vitals:   Vitals:    11/11/22 2231 11/11/22 2301 11/12/22 0104 11/12/22 0104   BP: 172/100 162/99 180/100    BP Location:       Patient Position:       Pulse:  91 88    Resp:       Temp:    99.1 °F (37.3 °C)   TempSrc:    Oral   SpO2:  93% 97%    Weight:       Height:           Active LDAs/IV Access:   Lines, Drains & Airways       Active LDAs       Name Placement date Placement time Site Days    Peripheral IV 11/11/22 2045 Right Antecubital 11/11/22 2045  Antecubital  less than 1                    Labs (abnormal labs have a star):   Labs Reviewed   COMPREHENSIVE METABOLIC PANEL - Abnormal; Notable for the following components:       Result Value    Glucose 111 (*)     Total Bilirubin 1.3 (*)     Anion Gap 15.6 (*)     All other components within normal limits    Narrative:     GFR Normal >60  Chronic Kidney Disease <60  Kidney Failure <15     URINALYSIS W/ MICROSCOPIC IF INDICATED (NO CULTURE) - Abnormal; Notable for the following components:    Color, UA Dark  Yellow (*)     Protein, UA Trace (*)     Leuk Esterase, UA Trace (*)     Urobilinogen, UA 2.0 E.U./dL (*)     All other components within normal limits   CBC WITH AUTO DIFFERENTIAL - Abnormal; Notable for the following components:    WBC 13.00 (*)     Neutrophil % 77.8 (*)     Lymphocyte % 13.5 (*)     Neutrophils, Absolute 10.11 (*)     All other components within normal limits   LIPASE - Normal   BNP (IN-HOUSE) - Normal    Narrative:     Among patients with dyspnea, NT-proBNP is highly sensitive for the detection of acute congestive heart failure. In addition NT-proBNP of <300 pg/ml effectively rules out acute congestive heart failure with 99% negative predictive value.    Results may be falsely decreased if patient taking Biotin.     TROPONIN (IN-HOUSE) - Normal    Narrative:     Troponin T Reference Range:  <= 0.03 ng/mL-   Negative for AMI  >0.03 ng/mL-     Abnormal for myocardial necrosis.  Clinicians would have to utilize clinical acumen, EKG, Troponin and serial changes to determine if it is an Acute Myocardial Infarction or myocardial injury due to an underlying chronic condition.       Results may be falsely decreased if patient taking Biotin.     URINALYSIS, MICROSCOPIC ONLY   BASIC METABOLIC PANEL   CBC WITH AUTO DIFFERENTIAL   CBC AND DIFFERENTIAL    Narrative:     The following orders were created for panel order CBC & Differential.  Procedure                               Abnormality         Status                     ---------                               -----------         ------                     CBC Auto Differential[[id number]]        Abnormal            Final result                 Please view results for these tests on the individual orders.       EKG:   ECG 12 Lead Dyspnea   Final Result   HEART RATE= 80  bpm   RR Interval= 750  ms   MD Interval= 172  ms   P Horizontal Axis= -11  deg   P Front Axis= 52  deg   QRSD Interval= 100  ms   QT Interval= 391  ms   QRS Axis= -19  deg   T Wave Axis=  12  deg   - OTHERWISE NORMAL ECG -   Sinus rhythm   Borderline left axis deviation   No Prior Tracing for Comparison   Electronically Signed By: Eric SantoKAILEY) ([facility]) 11-Nov-2022 23:30:32   Date and Time of Study: 2022-11-11 21:34:02          Meds given in ED:   Medications   sodium chloride 0.9 % flush 10 mL (has no administration in time range)   sodium chloride 0.9 % infusion (125 mL/hr Intravenous New Bag 11/12/22 0101)   sodium chloride 0.9 % flush 10 mL (has no administration in time range)   sodium chloride 0.9 % flush 10 mL (has no administration in time range)   nitroglycerin (NITROSTAT) SL tablet 0.4 mg (has no administration in time range)   morphine injection 4 mg (has no administration in time range)   piperacillin-tazobactam (ZOSYN) 3.375 g in iso-osmotic dextrose 50 ml (premix) (has no administration in time range)   morphine injection 4 mg (4 mg Intravenous Given 11/11/22 2049)   ondansetron (ZOFRAN) injection 4 mg (4 mg Intravenous Given 11/11/22 2048)   aluminum-magnesium hydroxide-simethicone (MAALOX MAX) 400-400-40 MG/5ML suspension 15 mL (15 mL Oral Given 11/11/22 2134)   Lidocaine Viscous HCl (XYLOCAINE) 2 % solution 15 mL (15 mL Mouth/Throat Given 11/11/22 2134)   iopamidol (ISOVUE-370) 76 % injection 95 mL (95 mL Intravenous Given by Other 11/11/22 2150)   piperacillin-tazobactam (ZOSYN) 3.375 g in iso-osmotic dextrose 50 ml (premix) (0 g Intravenous Stopped 11/12/22 0132)       Imaging results:  CT Angiogram Abdomen Pelvis    Result Date: 11/11/2022   1. No evidence of aortic dissection. 2. Patient's distal esophagus appears mildly thick walled and edematous, and the stomach is also distended with fluid and debris. Appearance may reflect nonspecific gastroesophagitis. 3. There are multiple stones identified within the gallbladder, and there is a suggestion of some gallbladder wall edema. Further assessment with gallbladder ultrasound is recommended.  Radiation dose reduction techniques  were utilized, including automated exposure control and exposure modulation based on body size.  This report was finalized on 11/11/2022 11:00 PM by Dr. Yenifer Box M.D.      US Gallbladder    Result Date: 11/12/2022   1. Distended gallbladder, which contains stones, and demonstrates wall thickening and trace pericholecystic fluid. Appearance is suspicious for acute cholecystitis. 2. The proximal common duct appears mildly prominent, although it does taper to normal caliber. No obvious choledocholithiasis is seen on the patient's earlier CT.  This report was finalized on 11/12/2022 12:22 AM by Dr. Yenifer Box M.D.      XR Chest 1 View    Result Date: 11/11/2022  No acute findings.  This report was finalized on 11/11/2022 9:07 PM by Dr. Yenifer Box M.D.      CT Angiogram Chest    Result Date: 11/11/2022   1. No evidence of aortic dissection. 2. Patient's distal esophagus appears mildly thick walled and edematous, and the stomach is also distended with fluid and debris. Appearance may reflect nonspecific gastroesophagitis. 3. There are multiple stones identified within the gallbladder, and there is a suggestion of some gallbladder wall edema. Further assessment with gallbladder ultrasound is recommended.  Radiation dose reduction techniques were utilized, including automated exposure control and exposure modulation based on body size.  This report was finalized on 11/11/2022 11:00 PM by Dr. Yenifer Box M.D.       Ambulatory status:   - indep    Social issues:   Social History     Socioeconomic History    Marital status:    Tobacco Use    Smoking status: Never   Substance and Sexual Activity    Alcohol use: Yes     Comment: socially    Drug use: Never    Sexual activity: Defer       NIH Stroke Scale:         Orquidea Springer RN  11/12/22 03:06 EST

## 2022-11-12 NOTE — ANESTHESIA POSTPROCEDURE EVALUATION
"Patient: Fady Diane    Procedure Summary     Date: 11/12/22 Room / Location: [facility] OR  / [facility] MAIN OR    Anesthesia Start: 1239 Anesthesia Stop: 1418    Procedure: CHOLECYSTECTOMY LAPAROSCOPIC INTRAOPERATIVE CHOLANGIOGRAM (Abdomen) Diagnosis:     Surgeons: Elizabeth Pritchard MD Provider: Kanu Jurado MD    Anesthesia Type: general ASA Status: 3          Anesthesia Type: general    Vitals  Vitals Value Taken Time   /102 11/12/22 1457   Temp 37.4 °C (99.3 °F) 11/12/22 1415   Pulse 84 11/12/22 1504   Resp 14 11/12/22 1440   SpO2 93 % 11/12/22 1504   Vitals shown include unvalidated device data.        Post Anesthesia Care and Evaluation    Patient location during evaluation: bedside  Patient participation: complete - patient participated  Level of consciousness: awake and alert  Pain score: 0  Pain management: adequate    Airway patency: patent  Anesthetic complications: No anesthetic complications    Cardiovascular status: acceptable  Respiratory status: acceptable  Hydration status: acceptable    Comments: BP (!) 184/102   Pulse 77   Temp 37.4 °C (99.3 °F) (Oral)   Resp 14   Ht 195.6 cm (77\")   Wt 119 kg (262 lb 6.4 oz)   SpO2 95%   BMI 31.12 kg/m²       "

## 2022-11-12 NOTE — H&P
Patient Name:  Fady Diane  YOB: 1965  MRN:  [id number]  Admit Date:  11/11/2022  Patient Care Team:  Colby Escobar MD as PCP - General (Family Medicine)      Subjective   History Present Illness     Chief Complaint   Patient presents with   • Abdominal Pain       Mr. Diane is a 57 y.o. male with a history of diabetes, hypertension, high cholesterol that presents to [facility] complaining of upper abdominal pain constant for about a day.  About 3 days ago he had a very sudden intense episode of epigastric pain which quickly moved to his chest and had some shortness of breath secondary to the pain, this lasted about 25 seconds and resolved.  The next day was feeling fine without any concerns throughout the day.  He ate a pork chop and stuffing for dinner and immediately had severe intense epigastric pain, no radiation to the chest and no shortness of breath that time.  No fever or chills.  No nausea vomiting diarrhea or constipation.  No recent weight loss or change in diet.  No sick contacts.    Patient states he used to have diabetes, still takes 2 pills for his diabetes and has an A1c of 5 last time he had it checked.  He has some hypertension for which has been well managed according to the patient.  He denies any history of heart attack, stroke, kidney disease, seizures.    Review of Systems   Constitutional: Negative for chills and fever.   HENT: Negative for congestion and rhinorrhea.    Eyes: Negative for discharge, itching and visual disturbance.   Respiratory: Negative for cough and shortness of breath.    Cardiovascular: Negative for chest pain, palpitations and leg swelling.   Gastrointestinal: Positive for abdominal pain. Negative for constipation, diarrhea, nausea and vomiting.   Genitourinary: Negative for difficulty urinating and dysuria.   Musculoskeletal: Negative for arthralgias and myalgias.   Skin: Negative for rash and wound.   Neurological: Negative  for dizziness and headaches.        Personal History     Past Medical History:   Diagnosis Date   • Diabetes (HCC) 11/12/2022   • GERD (gastroesophageal reflux disease)    • Hypertension    • PONV (postoperative nausea and vomiting)      Past Surgical History:   Procedure Laterality Date   • CERVICAL DISC SURGERY  2014    cervical fusion and hardware   • ELBOW PROCEDURE Left     cubical tunnel repair   • HERNIA REPAIR      double hernia repair 15 years ago   • INNER EAR SURGERY Bilateral 2011    reconstructed ear canals and ear drums   • KIDNEY STONE SURGERY      5 years ago   • NASAL SEPTUM SURGERY  2009     History reviewed. No pertinent family history.  Social History     Tobacco Use   • Smoking status: Never   Vaping Use   • Vaping Use: Never used   Substance Use Topics   • Alcohol use: Yes     Comment: socially   • Drug use: Never     No current facility-administered medications on file prior to encounter.     Current Outpatient Medications on File Prior to Encounter   Medication Sig Dispense Refill   • ACCU-CHEK CHAO PLUS test strip 1 STRIP BY OTHER ROUTE AS DIRECTED USE AS INSTRUCTED. DISPENSE BRAND COVERED BY INSURANCE.  0   • ACCU-CHEK SOFTCLIX LANCETS lancets INJECT 1 EACH INTO THE SKIN AS NEEDED FOR INJECTION.  3   • aspirin 81 MG tablet Take 81 mg by mouth Daily.     • Blood Glucose Calibration (OT ULTRA/FASTTK CNTRL SOLN) solution Inject 1 each under the skin into the appropriate area as directed.     • Blood Glucose Monitoring Suppl (ACCU-CHEK CHAO PLUS) w/Device kit See Admin Instructions.  0   • esomeprazole (nexIUM) 40 MG capsule Take 40 mg by mouth Daily.     • fluticasone (FLONASE) 50 MCG/ACT nasal spray 2 sprays into the nostril(s) as directed by provider.     • glimepiride (AMARYL) 2 MG tablet Take 2 mg by mouth.     • glucose blood (ONE TOUCH ULTRA TEST) test strip 1 strip by Other route.     • HYDROcodone-acetaminophen (NORCO) 7.5-325 MG per tablet   0   • ketorolac (TORADOL) 10 MG tablet  Take 1 tablet by mouth Every 8 (Eight) Hours As Needed for Moderate Pain . 15 tablet 0   • metFORMIN (GLUCOPHAGE) 500 MG tablet Take 1,000 mg by mouth 2 (Two) Times a Day With Meals.  3   • metoclopramide (REGLAN) 10 MG tablet Take 10 mg by mouth 2 (Two) Times a Day.     • multivitamin (THERAGRAN) tablet tablet Take 1 tablet by mouth Daily.     • olmesartan-hydrochlorothiazide (BENICAR HCT) 40-12.5 MG per tablet Take 1 tablet by mouth Daily.     • ondansetron ODT (ZOFRAN-ODT) 4 MG disintegrating tablet Place 1 tablet on the tongue Every 8 (Eight) Hours As Needed for Nausea. 10 tablet 0   • ONE TOUCH LANCETS misc Inject 1 each under the skin into the appropriate area as directed.     • oxyCODONE-acetaminophen (PERCOCET) 5-325 MG per tablet Take 1 tablet by mouth Every 4 (Four) Hours As Needed for Severe Pain . 10 tablet 0   • sildenafil (VIAGRA) 100 MG tablet Take 100 mg by mouth.     • SIMVASTATIN PO Take 25 mg by mouth Every Morning.     • tamsulosin (FLOMAX) 0.4 MG capsule 24 hr capsule Take 1 capsule by mouth Daily. 30 capsule 0   • traZODone (DESYREL) 300 MG tablet TAKE 1 TABLET BY MOUTH EVERY DAY IN THE EVENING       No Known Allergies    Objective    Objective     Vital Signs  Temp:  [97.7 °F (36.5 °C)-99.5 °F (37.5 °C)] 97.7 °F (36.5 °C)  Heart Rate:  [72-97] 72  Resp:  [18-20] 20  BP: (160-193)/() 187/107  SpO2:  [92 %-99 %] 95 %  on  Flow (L/min):  [2] 2;   Device (Oxygen Therapy): room air  Body mass index is 31.12 kg/m².    Physical Exam  Constitutional:       General: He is not in acute distress.     Appearance: He is not ill-appearing or toxic-appearing.   HENT:      Head: Normocephalic and atraumatic.      Mouth/Throat:      Mouth: Mucous membranes are moist.   Eyes:      Extraocular Movements: Extraocular movements intact.      Conjunctiva/sclera: Conjunctivae normal.      Pupils: Pupils are equal, round, and reactive to light.   Cardiovascular:      Rate and Rhythm: Normal rate and regular  rhythm.      Pulses: Normal pulses.      Heart sounds: Normal heart sounds.   Pulmonary:      Effort: Pulmonary effort is normal. No respiratory distress.      Breath sounds: Normal breath sounds.   Abdominal:      General: Bowel sounds are normal. There is no distension.      Palpations: Abdomen is soft.      Tenderness: There is no abdominal tenderness.   Musculoskeletal:         General: No swelling or tenderness. Normal range of motion.      Cervical back: Normal range of motion.   Skin:     General: Skin is warm and dry.   Neurological:      General: No focal deficit present.      Mental Status: He is alert and oriented to person, place, and time.   Psychiatric:         Mood and Affect: Mood normal.         Results Review:  I reviewed the patient's new clinical results.      Lab Results (last 24 hours)     Procedure Component Value Units Date/Time    CBC & Differential [[id number]]  (Abnormal) Collected: 11/11/22 2051    Specimen: Blood Updated: 11/11/22 2104    Narrative:      The following orders were created for panel order CBC & Differential.  Procedure                               Abnormality         Status                     ---------                               -----------         ------                     CBC Auto Differential[[id number]]        Abnormal            Final result                 Please view results for these tests on the individual orders.    Comprehensive Metabolic Panel [[id number]]  (Abnormal) Collected: 11/11/22 2051    Specimen: Blood Updated: 11/11/22 2124     Glucose 111 mg/dL      BUN 13 mg/dL      Creatinine 1.09 mg/dL      Sodium 140 mmol/L      Potassium 3.8 mmol/L      Chloride 102 mmol/L      CO2 22.4 mmol/L      Calcium 9.6 mg/dL      Total Protein 7.5 g/dL      Albumin 4.60 g/dL      ALT (SGPT) 23 U/L      AST (SGOT) 32 U/L      Alkaline Phosphatase 82 U/L      Total Bilirubin 1.3 mg/dL      Globulin 2.9 gm/dL      A/G Ratio 1.6 g/dL      BUN/Creatinine Ratio 11.9      Anion Gap 15.6 mmol/L      eGFR 78.7 mL/min/1.73      Comment: National Kidney Foundation and American Society of Nephrology (ASN) Task Force recommended calculation based on the Chronic Kidney Disease Epidemiology Collaboration (CKD-EPI) equation refit without adjustment for race.       Narrative:      GFR Normal >60  Chronic Kidney Disease <60  Kidney Failure <15      Lipase [[id number]]  (Normal) Collected: 11/11/22 2051    Specimen: Blood Updated: 11/11/22 2124     Lipase 24 U/L     BNP [[id number]]  (Normal) Collected: 11/11/22 2051    Specimen: Blood Updated: 11/11/22 2122     proBNP 42.0 pg/mL     Narrative:      Among patients with dyspnea, NT-proBNP is highly sensitive for the detection of acute congestive heart failure. In addition NT-proBNP of <300 pg/ml effectively rules out acute congestive heart failure with 99% negative predictive value.    Results may be falsely decreased if patient taking Biotin.      Troponin [[id number]]  (Normal) Collected: 11/11/22 2051    Specimen: Blood Updated: 11/11/22 2124     Troponin T <0.010 ng/mL     Narrative:      Troponin T Reference Range:  <= 0.03 ng/mL-   Negative for AMI  >0.03 ng/mL-     Abnormal for myocardial necrosis.  Clinicians would have to utilize clinical acumen, EKG, Troponin and serial changes to determine if it is an Acute Myocardial Infarction or myocardial injury due to an underlying chronic condition.       Results may be falsely decreased if patient taking Biotin.      CBC Auto Differential [[id number]]  (Abnormal) Collected: 11/11/22 2051    Specimen: Blood Updated: 11/11/22 2104     WBC 13.00 10*3/mm3      RBC 5.05 10*6/mm3      Hemoglobin 13.7 g/dL      Hematocrit 41.6 %      MCV 82.4 fL      MCH 27.1 pg      MCHC 32.9 g/dL      RDW 14.0 %      RDW-SD 41.7 fl      MPV 8.5 fL      Platelets 229 10*3/mm3      Neutrophil % 77.8 %      Lymphocyte % 13.5 %      Monocyte % 6.8 %      Eosinophil % 1.2 %      Basophil % 0.3 %      Immature Grans % 0.4 %       Neutrophils, Absolute 10.11 10*3/mm3      Lymphocytes, Absolute 1.75 10*3/mm3      Monocytes, Absolute 0.89 10*3/mm3      Eosinophils, Absolute 0.16 10*3/mm3      Basophils, Absolute 0.04 10*3/mm3      Immature Grans, Absolute 0.05 10*3/mm3      nRBC 0.0 /100 WBC     Hemoglobin A1c [[id number]]  (Normal) Collected: 11/11/22 2051    Specimen: Blood Updated: 11/12/22 1216     Hemoglobin A1C 4.90 %     Narrative:      Hemoglobin A1C Ranges:    Increased Risk for Diabetes  5.7% to 6.4%  Diabetes                     >= 6.5%  Diabetic Goal                < 7.0%    Urinalysis With Microscopic If Indicated (No Culture) - Urine, Clean Catch [[id number]]  (Abnormal) Collected: 11/12/22 0146    Specimen: Urine, Clean Catch Updated: 11/12/22 0211     Color, UA Dark Yellow     Appearance, UA Clear     pH, UA 8.0     Specific Gravity, UA >=1.030     Glucose, UA Negative     Ketones, UA Negative     Bilirubin, UA Negative     Blood, UA Negative     Protein, UA Trace     Leuk Esterase, UA Trace     Nitrite, UA Negative     Urobilinogen, UA 2.0 E.U./dL    Urinalysis, Microscopic Only - Urine, Clean Catch [[id number]] Collected: 11/12/22 0146    Specimen: Urine, Clean Catch Updated: 11/12/22 0211     RBC, UA 0-2 /HPF      WBC, UA 0-2 /HPF      Bacteria, UA None Seen /HPF      Squamous Epithelial Cells, UA 0-2 /HPF      Hyaline Casts, UA 0-2 /LPF      Methodology Automated Microscopy    COVID PRE-OP / PRE-PROCEDURE SCREENING ORDER (NO ISOLATION) - Swab, Nasopharynx [[id number]]  (Normal) Collected: 11/12/22 0346    Specimen: Swab from Nasopharynx Updated: 11/12/22 0446    Narrative:      The following orders were created for panel order COVID PRE-OP / PRE-PROCEDURE SCREENING ORDER (NO ISOLATION) - Swab, Nasopharynx.  Procedure                               Abnormality         Status                     ---------                               -----------         ------                     COVID-19, MANAN IN-HOUSE...[[id number]]  Normal               Final result                 Please view results for these tests on the individual orders.    COVID-19,[facility] MANAN IN-HOUSE CEPHEID/SAM NP SWAB IN TRANSPORT MEDIA 8-12 HR TAT - Swab, Nasopharynx [[id number]]  (Normal) Collected: 11/12/22 0346    Specimen: Swab from Nasopharynx Updated: 11/12/22 0446     COVID19 Not Detected    Narrative:      Fact sheet for providers: https://www.fda.gov/media/186090/download     Fact sheet for patients: https://www.fda.gov/media/554024/download    Basic Metabolic Panel [[id number]]  (Abnormal) Collected: 11/12/22 1036    Specimen: Blood Updated: 11/12/22 1141     Glucose 112 mg/dL      BUN 11 mg/dL      Creatinine 0.95 mg/dL      Sodium 138 mmol/L      Potassium 3.6 mmol/L      Chloride 106 mmol/L      CO2 22.0 mmol/L      Calcium 8.4 mg/dL      BUN/Creatinine Ratio 11.6     Anion Gap 10.0 mmol/L      eGFR 92.8 mL/min/1.73      Comment: National Kidney Foundation and American Society of Nephrology (ASN) Task Force recommended calculation based on the Chronic Kidney Disease Epidemiology Collaboration (CKD-EPI) equation refit without adjustment for race.       Narrative:      GFR Normal >60  Chronic Kidney Disease <60  Kidney Failure <15      CBC Auto Differential [[id number]]  (Abnormal) Collected: 11/12/22 1036    Specimen: Blood Updated: 11/12/22 1136     WBC 10.63 10*3/mm3      RBC 4.33 10*6/mm3      Hemoglobin 11.8 g/dL      Hematocrit 33.6 %      MCV 77.6 fL      MCH 27.3 pg      MCHC 35.1 g/dL      RDW 13.4 %      RDW-SD 38.1 fl      MPV 8.6 fL      Platelets 181 10*3/mm3      Neutrophil % 74.2 %      Lymphocyte % 13.6 %      Monocyte % 10.9 %      Eosinophil % 0.8 %      Basophil % 0.2 %      Immature Grans % 0.3 %      Neutrophils, Absolute 7.89 10*3/mm3      Lymphocytes, Absolute 1.45 10*3/mm3      Monocytes, Absolute 1.16 10*3/mm3      Eosinophils, Absolute 0.08 10*3/mm3      Basophils, Absolute 0.02 10*3/mm3      Immature Grans, Absolute 0.03 10*3/mm3      nRBC 0.0  /100 WBC     POC Glucose Once [[id number]]  (Normal) Collected: 11/12/22 1108    Specimen: Blood Updated: 11/12/22 1110     Glucose 115 mg/dL      Comment: Meter: [id number] : [id number] Cabrera MEYERS       POC Glucose Once [[id number]]  (Abnormal) Collected: 11/12/22 1417    Specimen: Blood Updated: 11/12/22 1418     Glucose 157 mg/dL      Comment: Meter: [id number] : [id number] Deborah Pritchard RN             Imaging Results (Last 24 Hours)     Procedure Component Value Units Date/Time    FL Cholangiogram Operative [[id number]] Resulted: 11/12/22 1358     Updated: 11/12/22 1400    US Gallbladder [[id number]] Collected: 11/12/22 0017     Updated: 11/12/22 0025    Narrative:      GALLBLADDER ULTRASOUND     HISTORY: Nausea and vomiting     COMPARISON: 11/11/2022     TECHNIQUE: Grayscale and color Doppler sonographic images were obtained  through the right upper quadrant.     FINDINGS:  Pancreas is poorly visualized. It appeared normal on the patient's  earlier CT. The liver does appear mildly steatotic. No focal hepatic  lesions are seen. There is no intrahepatic biliary dilatation. Common  bile duct measures up to 6 mm. This is at the upper limits of normal. It  does taper to normal caliber distally. No definite choledocholithiasis  was identified on the earlier CT. The gallbladder contains stones, and  is thick-walled, with gallbladder wall measuring up to 5 mm. There is  also some trace pericholecystic fluid. Appearance is concerning for  acute cholecystitis. There is also a suggestion of some adenomyomatosis.  The right kidney measures 12.5 x 4.6 x 5.7 cm. No hydronephrosis is  seen. Renal echotexture is normal.       Impression:         1. Distended gallbladder, which contains stones, and demonstrates wall  thickening and trace pericholecystic fluid. Appearance is suspicious for  acute cholecystitis.  2. The proximal common duct appears mildly prominent, although it does  taper to normal caliber. No obvious  choledocholithiasis is seen on the  patient's earlier CT.     This report was finalized on 11/12/2022 12:22 AM by Dr. Yenifer Box M.D.       CT Angiogram Chest [[id number]] Collected: 11/11/22 2246     Updated: 11/11/22 2303    Narrative:      CT ANGIOGRAM OF THE CHEST, ABDOMEN, AND PELVIS     HISTORY: Epigastric pain and hypertension     COMPARISON: 05/14/2020     TECHNIQUE: Axial CT imaging was obtained through the thorax. IV contrast  was administered. Three-D reformatted images were obtained.     FINDINGS:  CT CHEST: Exam is limited by timing of the contrast bolus. The thoracic  aorta is normal in caliber. There is no evidence of dissection. No  obvious acute pulmonary thromboembolus is seen. There is some  calcification of the thoracic aorta and coronary arteries. Mediastinal  and hilar lymph nodes do not appear pathologically enlarged. The  patient's distal esophagus appear somewhat thick-walled and edematous.  Appearance may reflect esophagitis. Thyroid gland and trachea appear  normal. There is some mild dependent atelectasis. No acute osseous  abnormalities are seen.     CT ABDOMEN AND PELVIS. Patient's stomach does appear to be distended  with fluid and debris. The gallbladder contains gallstones. There is a  suggestion of some gallbladder wall edema. Further assessment with  gallbladder ultrasound is recommended. Abdominal aorta is normal in  caliber. There is no evidence of dissection. There is some mild  calcification of the aorta. The celiac axis, and superior mesenteric  artery are widely patent. No flow-limiting stenosis of the renal  arteries is seen, and the inferior mesenteric artery is patent. Patient  does have some mild calcified plaque within the right common iliac  artery. There is no hemodynamically significant stenosis. Patient  appears to have some tiny cysts within the liver. Liver is suspected to  be mildly steatotic. There is borderline splenomegaly, with spleen  measuring 13.9 cm  in craniocaudal dimensions. Duodenum is normal. There  are some prominent upper abdominal lymph nodes. For example, a node  within the gastrohepatic ligament measures up to 1.6 cm in short axis  dimensions. This is slightly larger than on prior exam, when measures  1.4 cm. The kidneys enhance symmetrically. There is no hydronephrosis.  Nonobstructing stones are noted within the right kidney, with the  largest measuring up to 4 mm. There is cortical thinning are seen on  both kidneys, suggesting the sequela of prior insults. There are simple  appearing bilateral renal cysts. No distal ureteral or bladder stones  are seen. Prostate gland and urinary bladder appear normal. There is  colonic diverticulosis. Is no bowel obstruction. Appendix is normal.  There is a fat-containing umbilical hernia. No acute osseous  abnormalities are seen.       Impression:         1. No evidence of aortic dissection.  2. Patient's distal esophagus appears mildly thick walled and edematous,  and the stomach is also distended with fluid and debris. Appearance may  reflect nonspecific gastroesophagitis.  3. There are multiple stones identified within the gallbladder, and  there is a suggestion of some gallbladder wall edema. Further assessment  with gallbladder ultrasound is recommended.     Radiation dose reduction techniques were utilized, including automated  exposure control and exposure modulation based on body size.     This report was finalized on 11/11/2022 11:00 PM by Dr. Yenifer Box M.D.       CT Angiogram Abdomen Pelvis [[id number]] Collected: 11/11/22 2246     Updated: 11/11/22 2303    Narrative:      CT ANGIOGRAM OF THE CHEST, ABDOMEN, AND PELVIS     HISTORY: Epigastric pain and hypertension     COMPARISON: 05/14/2020     TECHNIQUE: Axial CT imaging was obtained through the thorax. IV contrast  was administered. Three-D reformatted images were obtained.     FINDINGS:  CT CHEST: Exam is limited by timing of the contrast  bolus. The thoracic  aorta is normal in caliber. There is no evidence of dissection. No  obvious acute pulmonary thromboembolus is seen. There is some  calcification of the thoracic aorta and coronary arteries. Mediastinal  and hilar lymph nodes do not appear pathologically enlarged. The  patient's distal esophagus appear somewhat thick-walled and edematous.  Appearance may reflect esophagitis. Thyroid gland and trachea appear  normal. There is some mild dependent atelectasis. No acute osseous  abnormalities are seen.     CT ABDOMEN AND PELVIS. Patient's stomach does appear to be distended  with fluid and debris. The gallbladder contains gallstones. There is a  suggestion of some gallbladder wall edema. Further assessment with  gallbladder ultrasound is recommended. Abdominal aorta is normal in  caliber. There is no evidence of dissection. There is some mild  calcification of the aorta. The celiac axis, and superior mesenteric  artery are widely patent. No flow-limiting stenosis of the renal  arteries is seen, and the inferior mesenteric artery is patent. Patient  does have some mild calcified plaque within the right common iliac  artery. There is no hemodynamically significant stenosis. Patient  appears to have some tiny cysts within the liver. Liver is suspected to  be mildly steatotic. There is borderline splenomegaly, with spleen  measuring 13.9 cm in craniocaudal dimensions. Duodenum is normal. There  are some prominent upper abdominal lymph nodes. For example, a node  within the gastrohepatic ligament measures up to 1.6 cm in short axis  dimensions. This is slightly larger than on prior exam, when measures  1.4 cm. The kidneys enhance symmetrically. There is no hydronephrosis.  Nonobstructing stones are noted within the right kidney, with the  largest measuring up to 4 mm. There is cortical thinning are seen on  both kidneys, suggesting the sequela of prior insults. There are simple  appearing bilateral renal  cysts. No distal ureteral or bladder stones  are seen. Prostate gland and urinary bladder appear normal. There is  colonic diverticulosis. Is no bowel obstruction. Appendix is normal.  There is a fat-containing umbilical hernia. No acute osseous  abnormalities are seen.       Impression:         1. No evidence of aortic dissection.  2. Patient's distal esophagus appears mildly thick walled and edematous,  and the stomach is also distended with fluid and debris. Appearance may  reflect nonspecific gastroesophagitis.  3. There are multiple stones identified within the gallbladder, and  there is a suggestion of some gallbladder wall edema. Further assessment  with gallbladder ultrasound is recommended.     Radiation dose reduction techniques were utilized, including automated  exposure control and exposure modulation based on body size.     This report was finalized on 11/11/2022 11:00 PM by Dr. Yenifer Box M.D.       XR Chest 1 View [[id number]] Collected: 11/11/22 2104     Updated: 11/11/22 2110    Narrative:      SINGLE VIEW OF THE CHEST     HISTORY: Shortness of air     COMPARISON: None available.     FINDINGS:  Cardiomegaly is present. There is no vascular congestion. No  pneumothorax is seen. There is blunting of the left costophrenic angle,  although it appears to be chronic. No acute infiltrates are identified.       Impression:      No acute findings.     This report was finalized on 11/11/2022 9:07 PM by Dr. Yenifer Box M.D.                 ECG 12 Lead Dyspnea   Final Result   HEART RATE= 80  bpm   RR Interval= 750  ms   MS Interval= 172  ms   P Horizontal Axis= -11  deg   P Front Axis= 52  deg   QRSD Interval= 100  ms   QT Interval= 391  ms   QRS Axis= -19  deg   T Wave Axis= 12  deg   - OTHERWISE NORMAL ECG -   Sinus rhythm   Borderline left axis deviation   No Prior Tracing for Comparison   Electronically Signed By: Eric SantoKAILEY) ([facility]) 11-Nov-2022 23:30:32   Date and Time of Study:  2022-11-11 21:34:02           Assessment/Plan     Active Hospital Problems    Diagnosis  POA   • **Acute cholecystitis [K81.0]  Yes   • Hypertension [I10]  Unknown   • Diabetes (HCC) [E11.9]  Unknown      Resolved Hospital Problems   No resolved problems to display.     White count 13, neutrophils 77.8  CT abdomen and pelvis noted with multiple stones in the gallbladder and some gallbladder wall distention, mild thick walled edematous distal esophagus and stomach distention with fluid and debris's, possible gastroesophagitis.  Ultrasound of the gallbladder confirming distended gallbladder with stones, wall thickening, acute cholecystitis.  General surgery consulted.  Continue Zosyn and IV fluids.  Keep n.p.o.  Pain and nausea medicine as needed.    Hold p.o. diabetic regimen.  Resume home antihypertensive regimen.    · I discussed the patient's findings and my recommendations with patient.    VTE Prophylaxis - SCDs.  Code Status - Full code.       RODDY Moody  [facility]  11/12/22  14:26 EST

## 2022-11-13 VITALS
OXYGEN SATURATION: 93 % | WEIGHT: 262.4 LBS | SYSTOLIC BLOOD PRESSURE: 175 MMHG | BODY MASS INDEX: 30.98 KG/M2 | DIASTOLIC BLOOD PRESSURE: 99 MMHG | TEMPERATURE: 97.2 F | HEIGHT: 77 IN | HEART RATE: 94 BPM | RESPIRATION RATE: 16 BRPM

## 2022-11-13 LAB
ALBUMIN SERPL-MCNC: 3.9 G/DL (ref 3.5–5.2)
ALBUMIN/GLOB SERPL: 1.5 G/DL
ALP SERPL-CCNC: 91 U/L (ref 39–117)
ALT SERPL W P-5'-P-CCNC: 128 U/L (ref 1–41)
ANION GAP SERPL CALCULATED.3IONS-SCNC: 10.9 MMOL/L (ref 5–15)
AST SERPL-CCNC: 63 U/L (ref 1–40)
BASOPHILS # BLD AUTO: 0.01 10*3/MM3 (ref 0–0.2)
BASOPHILS NFR BLD AUTO: 0.1 % (ref 0–1.5)
BILIRUB SERPL-MCNC: 0.9 MG/DL (ref 0–1.2)
BUN SERPL-MCNC: 10 MG/DL (ref 6–20)
BUN/CREAT SERPL: 11.8 (ref 7–25)
CALCIUM SPEC-SCNC: 8.7 MG/DL (ref 8.6–10.5)
CHLORIDE SERPL-SCNC: 103 MMOL/L (ref 98–107)
CO2 SERPL-SCNC: 23.1 MMOL/L (ref 22–29)
CREAT SERPL-MCNC: 0.85 MG/DL (ref 0.76–1.27)
DEPRECATED RDW RBC AUTO: 37.2 FL (ref 37–54)
EGFRCR SERPLBLD CKD-EPI 2021: 100.7 ML/MIN/1.73
EOSINOPHIL # BLD AUTO: 0.01 10*3/MM3 (ref 0–0.4)
EOSINOPHIL NFR BLD AUTO: 0.1 % (ref 0.3–6.2)
ERYTHROCYTE [DISTWIDTH] IN BLOOD BY AUTOMATED COUNT: 13.2 % (ref 12.3–15.4)
GLOBULIN UR ELPH-MCNC: 2.6 GM/DL
GLUCOSE SERPL-MCNC: 139 MG/DL (ref 65–99)
HCT VFR BLD AUTO: 34.6 % (ref 37.5–51)
HGB BLD-MCNC: 12.1 G/DL (ref 13–17.7)
IMM GRANULOCYTES # BLD AUTO: 0.04 10*3/MM3 (ref 0–0.05)
IMM GRANULOCYTES NFR BLD AUTO: 0.4 % (ref 0–0.5)
LYMPHOCYTES # BLD AUTO: 1.12 10*3/MM3 (ref 0.7–3.1)
LYMPHOCYTES NFR BLD AUTO: 11.5 % (ref 19.6–45.3)
MCH RBC QN AUTO: 27.1 PG (ref 26.6–33)
MCHC RBC AUTO-ENTMCNC: 35 G/DL (ref 31.5–35.7)
MCV RBC AUTO: 77.4 FL (ref 79–97)
MONOCYTES # BLD AUTO: 0.87 10*3/MM3 (ref 0.1–0.9)
MONOCYTES NFR BLD AUTO: 8.9 % (ref 5–12)
NEUTROPHILS NFR BLD AUTO: 7.71 10*3/MM3 (ref 1.7–7)
NEUTROPHILS NFR BLD AUTO: 79 % (ref 42.7–76)
NRBC BLD AUTO-RTO: 0 /100 WBC (ref 0–0.2)
PLATELET # BLD AUTO: 184 10*3/MM3 (ref 140–450)
PMV BLD AUTO: 8.9 FL (ref 6–12)
POTASSIUM SERPL-SCNC: 4.1 MMOL/L (ref 3.5–5.2)
PROT SERPL-MCNC: 6.5 G/DL (ref 6–8.5)
RBC # BLD AUTO: 4.47 10*6/MM3 (ref 4.14–5.8)
SODIUM SERPL-SCNC: 137 MMOL/L (ref 136–145)
WBC NRBC COR # BLD: 9.76 10*3/MM3 (ref 3.4–10.8)

## 2022-11-13 PROCEDURE — 85025 COMPLETE CBC W/AUTO DIFF WBC: CPT | Performed by: SURGERY

## 2022-11-13 PROCEDURE — G0378 HOSPITAL OBSERVATION PER HR: HCPCS

## 2022-11-13 PROCEDURE — 80053 COMPREHEN METABOLIC PANEL: CPT | Performed by: SURGERY

## 2022-11-13 PROCEDURE — 99024 POSTOP FOLLOW-UP VISIT: CPT | Performed by: SURGERY

## 2022-11-13 RX ADMIN — LOSARTAN POTASSIUM 50 MG: 50 TABLET, FILM COATED ORAL at 10:47

## 2022-11-13 RX ADMIN — LINAGLIPTIN 5 MG: 5 TABLET, FILM COATED ORAL at 10:47

## 2022-11-13 RX ADMIN — Medication 10 ML: at 10:48

## 2022-11-13 RX ADMIN — AMLODIPINE BESYLATE 10 MG: 10 TABLET ORAL at 10:47

## 2022-11-13 RX ADMIN — TAMSULOSIN HYDROCHLORIDE 0.4 MG: 0.4 CAPSULE ORAL at 10:47

## 2022-11-13 RX ADMIN — METOPROLOL SUCCINATE 50 MG: 50 TABLET, FILM COATED, EXTENDED RELEASE ORAL at 10:47

## 2022-11-13 RX ADMIN — MINOXIDIL 10 MG: 10 TABLET ORAL at 10:47

## 2022-11-13 RX ADMIN — PANTOPRAZOLE SODIUM 40 MG: 40 TABLET, DELAYED RELEASE ORAL at 10:47

## 2022-11-13 NOTE — PROGRESS NOTES
"General Surgery Progress Note    POD#1 status post lap srikanth    S: Abdominal pain resolved.  Now only complains of incisional pain.  Tolerated crackers and Jell-O.  Wants to be discharged home    O:/99 (BP Location: Left arm, Patient Position: Lying)   Pulse 62   Temp 97.2 °F (36.2 °C) (Oral)   Resp 16   Ht 195.6 cm (77\")   Wt 119 kg (262 lb 6.4 oz)   SpO2 93%   BMI 31.12 kg/m²     Intake & Output (last day)       11/12 0701 11/13 0700 11/13 0701 11/14 0700    I.V. (mL/kg) 1100 (9.2)     Total Intake(mL/kg) 1100 (9.2)     Urine (mL/kg/hr) 1175 (0.4)     Blood 15     Total Output 1190     Net -90                 GENERAL: awake, alert, no apparent distress  HEENT: normochephalic, atraumatic, moist mucus membranes, clear sclera   CHEST: clear to auscultation, no wheezes, no increased work of breathing  CARDIAC: regular rate and rhythm    ABDOMEN: Soft, nondistended, nontender, incisions clean dry and intact  EXTREMITIES: no cyanosis, clubbing or edema    SKIN: Warm and moist, no rashes    LABS  Results from last 7 days   Lab Units 11/13/22  0728 11/12/22  1036 11/11/22  2051   WBC 10*3/mm3 9.76 10.63 13.00*   HEMOGLOBIN g/dL 12.1* 11.8* 13.7   HEMATOCRIT % 34.6* 33.6* 41.6   PLATELETS 10*3/mm3 184 181 229     Results from last 7 days   Lab Units 11/13/22  0728 11/12/22  1036 11/11/22 2051   SODIUM mmol/L 137 138 140   POTASSIUM mmol/L 4.1 3.6 3.8   CHLORIDE mmol/L 103 106 102   CO2 mmol/L 23.1 22.0 22.4   BUN mg/dL 10 11 13   CREATININE mg/dL 0.85 0.95 1.09   CALCIUM mg/dL 8.7 8.4* 9.6   BILIRUBIN mg/dL 0.9  --  1.3*   ALK PHOS U/L 91  --  82   ALT (SGPT) U/L 128*  --  23   AST (SGOT) U/L 63*  --  32   GLUCOSE mg/dL 139* 112* 111*             A/P: 57 y.o. male with acute cholecystitis status post lap srikanth.  -Okay for regular diet  -Okay to discharge home today  -Follow-up with me in 2 weeks.  Discharge instructions placed in discharge tab.      REGGIE FRANK MD  General, Robotic and Endoscopic " Surgery  [facility]    [address], [address]  [place], [place], [address]  P: [phone]  F: [phone]

## 2022-11-13 NOTE — PLAN OF CARE
Goal Outcome Evaluation:  Plan of Care Reviewed With: patient      Pt A&Ox4. He c/o abd tenderness, pain meds offered but refused. Pt adamant about being discharged. He states he is not diabetic so he is refusing blood glucose checks. Bp remains elevated, which may be due to not taking bp meds while having an acute gallbladder attack. Bp meds have been resumed per MD order. Abd is soft with audible bowel sounds. Pt tolerated oatmeal this am, no c/o pain or nausea. Pt is ready for discharge.  Progress: improving        11/13/22 1230    Pt verbalized understanding of d/c instructions. IV is out.

## 2022-11-13 NOTE — NURSING NOTE
Pt A&Ox4. He is on RA. BP is elevated, MD is aware, pt BP meds restarted. Waiting for bp meds from pharmacy. Pt anticipated to go home tonight, but has agreed to stay and monitored over night. Dr. Preciado wants to make sure pt can tolerate a diet. Pt now has 5 lap site, CDI. Pt is up walking around, has taken off bp cuff. NAD noted. He tolerated chicken broth and diet sprite. bs monitoring continued. Will continue to monitor.

## 2022-11-13 NOTE — PLAN OF CARE
"Goal Outcome Evaluation:   Patient slept abed last night and denies any c/o pain or discomfort to his abdominal areas and laproscopic incisions are clean, intact, and open to air. Patient refused any blood glucose checks this shift, stating \"I don't have any problem with my sugars\". Lungs are clear, bowel sounds are hypoactive his abdomen is slightly distended and non-tender.  He has been voiding via urinal during the night and has concentrated yellow urine.               "

## 2022-11-13 NOTE — DISCHARGE SUMMARY
PHYSICIAN DISCHARGE SUMMARY                                                                        [facility]    Patient Identification:  Name: Fady Diane  Age: 57 y.o.  Sex: male  :  1965  MRN: [id number]  Primary Care Physician: Colby Escobar MD    Admit date: 2022  Discharge date and time: 2022     Discharged Condition: good    Discharge Diagnoses:  Acute cholecystitis    Hypertension    Diabetes (HCC)  Obesity      Hospital Course:  57-year-old gentleman complaining of upper abdominal pain.  Please H&P for full details.  Work-up including CT scan abdomen and ultrasound did reveal acute cholecystitis.  The patient was admitted and surgical consultation was obtained.  That evening he underwent laparoscopic cholecystectomy.  He is done very nicely overnight.  This morning he is tolerating his diet well and eager to return home.    Consults:     Consults     Date and Time Order Name Status Description    2022 12:20 AM [facility] (on-call MD unless specified) Details      2022 12:10 AM Surgery (on-call MD unless specified) Completed             Discharge Exam:  Afebrile vital signs stable.  Well-developed obese male in no apparent distress.  Lungs clear to auscultation good air movement.  Heart regular rate and rhythm.  Abdomen with normal bowel sounds.  Minimal tenderness consistent with recent procedure.  No organomegaly guarding or masses.   Extremities no clubbing cyanosis or edema.  Alert and oriented.    Disposition:  Home    Patient Instructions:      Discharge Medications      Continue These Medications      Instructions Start Date   Accu-Chek Elizabeth Plus w/Device kit   See Admin Instructions      amLODIPine 10 MG tablet  Commonly known as: NORVASC   10 mg, Oral, Daily      esomeprazole 40 MG capsule  Commonly known as: nexIUM   20 mg, Oral, Every Morning Before Breakfast      fluticasone 50  MCG/ACT nasal spray  Commonly known as: FLONASE   2 sprays, Nasal      glimepiride 2 MG tablet  Commonly known as: AMARYL   2 mg, Oral      metFORMIN 500 MG tablet  Commonly known as: GLUCOPHAGE   1,000 mg, Oral, 2 Times Daily With Meals      metoclopramide 10 MG tablet  Commonly known as: REGLAN   10 mg, Oral, 4 Times Daily      metoprolol succinate XL 50 MG 24 hr tablet  Commonly known as: TOPROL-XL   50 mg, Oral, Daily      minoxidil 10 MG tablet  Commonly known as: LONITEN   10 mg, Oral, Daily      multivitamin tablet tablet   1 tablet, Oral, Daily      ONE TOUCH LANCETS misc   1 each, Subcutaneous      Accu-Chek Softclix Lancets lancets   INJECT 1 EACH INTO THE SKIN AS NEEDED FOR INJECTION.      ONE TOUCH ULTRA TEST test strip  Generic drug: glucose blood   1 strip, Other      Accu-Chek Elizabeth Plus test strip  Generic drug: glucose blood   1 STRIP BY OTHER ROUTE AS DIRECTED USE AS INSTRUCTED. DISPENSE BRAND COVERED BY INSURANCE.      OT ULTRA/FASTTK CNTRL SOLN solution   1 each, Subcutaneous      QUEtiapine 50 MG tablet  Commonly known as: SEROquel   50 mg, Oral, Nightly      sildenafil 100 MG tablet  Commonly known as: VIAGRA   100 mg, Oral, As Needed      SIMVASTATIN PO   20 mg, Oral, Nightly      SITagliptin 100 MG tablet  Commonly known as: JANUVIA   100 mg, Oral, Daily           Diet Instructions     Advance Diet As Tolerated          No future appointments.  Additional Instructions for the Follow-ups that You Need to Schedule     Discharge Follow-up with PCP   As directed       Currently Documented PCP:    Colby Escobar MD    PCP Phone Number:    [phone]     Follow Up Details: 1 week         Discharge Follow-up with Specialty: Surgery   As directed      Specialty: Surgery    Follow Up Details: As directed            Follow-up Information     Elizabeth Pritchard MD. Schedule an appointment as soon as possible for a visit in 2 week(s).    Specialty: General Surgery  Contact information:  [address]  [facility] [address]  [facility] [address]  [phone]             Colby Escobar MD .    Specialty: Family Medicine  Why: 1 week  Contact information:  [address]  [facility] [address]  [place] [id number]  [phone]                       Discharge Order (From admission, onward)     Start     Ordered    11/13/22 1108  Discharge patient  Once        Expected Discharge Date: 11/13/22    Discharge Disposition: Home or Self Care    Physician of Record for Attribution - Please select from Treatment Team: JOHN PRECIADO [[id number]]    Review needed by CMO to determine Physician of Record: No       Question Answer Comment   Physician of Record for Attribution - Please select from Treatment Team JOHN PRECIADO    Review needed by CMO to determine Physician of Record No        11/13/22 1112                  Total time spent discharging patient including evaluation,post hospitalization follow up,  medication and post hospitalization instructions and education total time exceeds 30 minutes.    Signed:  John Preciado MD  11/13/2022  11:14 EST    EMR Dragon/Transcription disclaimer:   Much of this encounter note is an electronic transcription/translation of spoken language to printed text. The electronic translation of spoken language may permit erroneous, or at times, nonsensical words or phrases to be inadvertently transcribed; Although I have reviewed the note for such errors, some may still exist.

## 2022-11-14 NOTE — CASE MANAGEMENT/SOCIAL WORK
Case Management Discharge Note      Final Note: Per MD note pt discharged home on 11/13.   ERIC Scott RN         Selected Continued Care - Discharged on 11/13/2022 Admission date: 11/11/2022 - Discharge disposition: Home or Self Care    Destination    No services have been selected for the patient.              Durable Medical Equipment    No services have been selected for the patient.              Dialysis/Infusion    No services have been selected for the patient.              Home Medical Care    No services have been selected for the patient.              Therapy    No services have been selected for the patient.              Community Resources    No services have been selected for the patient.              Community & DME    No services have been selected for the patient.                  Transportation Services  Private: Car    Final Discharge Disposition Code: 01 - home or self-care

## 2022-11-15 LAB
LAB AP CASE REPORT: NORMAL
PATH REPORT.FINAL DX SPEC: NORMAL
PATH REPORT.GROSS SPEC: NORMAL

## 2022-11-29 ENCOUNTER — OFFICE VISIT (OUTPATIENT)
Dept: SURGERY | Facility: CLINIC | Age: 57
End: 2022-11-29

## 2022-11-29 DIAGNOSIS — K81.0 ACUTE CHOLECYSTITIS: Primary | ICD-10-CM

## 2022-11-29 PROCEDURE — 99024 POSTOP FOLLOW-UP VISIT: CPT | Performed by: SURGERY

## 2022-11-29 RX ORDER — USTEKINUMAB 90 MG/ML
INJECTION, SOLUTION SUBCUTANEOUS
COMMUNITY
Start: 2022-10-14

## 2022-11-29 RX ORDER — KETOCONAZOLE 20 MG/G
CREAM TOPICAL
COMMUNITY
Start: 2022-10-10

## 2022-11-29 RX ORDER — SIMVASTATIN 20 MG
20 TABLET ORAL
COMMUNITY
Start: 2022-09-11

## 2022-11-29 RX ORDER — KETOCONAZOLE 20 MG/ML
SHAMPOO TOPICAL
COMMUNITY
Start: 2022-10-18

## 2022-11-29 NOTE — PROGRESS NOTES
General Surgery Post-Operative Follow Up Note     History of Present Illness:    Fady Diane is a 57 y.o. year old male who presents for post-operative follow up from laparoscopic cholecystectomy with IOC.  He is doing well postoperatively.  Reports he only had sharp pain when he coughed or sneezed.  Also said he was not sent home with pain medicine.    Procedure:    • Laparoscopic cholecystectomy with IOC    Pathology:    1. Gallbladder, Cholecystectomy: Benign gallbladder with               A. Cholelithiasis.               B. Acute and chronic cholecystitis.    Physical Exam:   • Constitutional: Well-developed well-nourished, no acute distress  • Eyes: Conjunctiva normal, sclera nonicteric  • ENMT: Hearing grossly normal, oral mucosa moist  • Respiratory: No increased work of breathing, normal inspiratory effort  • Cardiovascular: Regular rate, no peripheral edema, no jugular venous distention  • Gastrointestinal: Soft, nontender, incisions healing well  • Skin:  Warm, dry, no rash on visualized skin surfaces  • Musculoskeletal: Symmetric strength, normal gait  • Psychiatric: Alert and oriented ×3, normal affect       Assessment/Plan:  -Okay to resume activities including lifting  -Follow-up with me as needed      Elizabeth Pritchard M.D.  General, Robotic and Endoscopic Surgery  [facility]    [address], [address]  [place], [place], [address]  P: [phone]  F: [phone]

## 2024-11-12 ENCOUNTER — TRANSCRIBE ORDERS (OUTPATIENT)
Dept: ADMINISTRATIVE | Facility: HOSPITAL | Age: 59
End: 2024-11-12
Payer: COMMERCIAL

## 2024-11-12 DIAGNOSIS — I10 HYPERTENSION, ESSENTIAL: Primary | ICD-10-CM

## 2024-11-12 DIAGNOSIS — N18.2 CHRONIC KIDNEY DISEASE, STAGE II (MILD): ICD-10-CM

## 2024-11-12 DIAGNOSIS — E11.9 DIABETES MELLITUS WITHOUT COMPLICATION: ICD-10-CM

## 2024-12-12 ENCOUNTER — HOSPITAL ENCOUNTER (OUTPATIENT)
Dept: CARDIOLOGY | Facility: HOSPITAL | Age: 59
Discharge: HOME | End: 2024-12-12
Admitting: INTERNAL MEDICINE
Payer: COMMERCIAL

## 2024-12-12 DIAGNOSIS — E11.9 DIABETES MELLITUS WITHOUT COMPLICATION: ICD-10-CM

## 2024-12-12 DIAGNOSIS — N18.2 CHRONIC KIDNEY DISEASE, STAGE II (MILD): ICD-10-CM

## 2024-12-12 DIAGNOSIS — I10 HYPERTENSION, ESSENTIAL: ICD-10-CM

## 2024-12-12 LAB
BH CV ECHO MEAS - DIST REN A EDV LEFT: 30.6 CM/S
BH CV ECHO MEAS - DIST REN A PSV LEFT: 97.6 CM/S
BH CV ECHO MEAS - MID REN A EDV LEFT: 27 CM/S
BH CV ECHO MEAS - MID REN A PSV LEFT: 98.7 CM/S
BH CV ECHO MEAS - PROX REN A EDV LEFT: 26 CM/S
BH CV ECHO MEAS - PROX REN A PSV LEFT: 87.4 CM/S
BH CV VAS RENAL AORTIC MID PSV: 128 CM/S
BH CV VAS RENAL HILUM LEFT EDV: 7 CM/S
BH CV VAS RENAL HILUM LEFT PSV: 27.8 CM/S
BH CV VAS RENAL HILUM RIGHT EDV: 16.3 CM/S
BH CV VAS RENAL HILUM RIGHT PSV: 38.1 CM/S
BH CV XLRA MEAS - KID L LEFT: 12.9 CM
BH CV XLRA MEAS DIST REN A EDV RIGHT: 43.5 CM/S
BH CV XLRA MEAS DIST REN A PSV RIGHT: 124.6 CM/S
BH CV XLRA MEAS KID L RIGHT: 12.8 CM
BH CV XLRA MEAS KID W RIGHT: 6 CM
BH CV XLRA MEAS MID REN A EDV RIGHT: 45.1 CM/S
BH CV XLRA MEAS MID REN A PSV RIGHT: 131.2 CM/S
BH CV XLRA MEAS PROX REN A EDV RIGHT: 29.8 CM/S
BH CV XLRA MEAS PROX REN A PSV RIGHT: 93.9 CM/S
BH CV XLRA MEAS RAR LEFT: 0.78
BH CV XLRA MEAS RAR RIGHT: 1.03
BH CV XLRA MEAS RENAL A ORG EDV LEFT: 23.9 CM/S
BH CV XLRA MEAS RENAL A ORG EDV RIGHT: 17.8 CM/S
BH CV XLRA MEAS RENAL A ORG PSV LEFT: 99.7 CM/S
BH CV XLRA MEAS RENAL A ORG PSV RIGHT: 76.4 CM/S
LEFT KIDNEY WIDTH: 6.8 CM
LEFT RENAL UPPER PARENCHYMA MAX: 24.9 CM/S
LEFT RENAL UPPER PARENCHYMA MIN: 7.6 CM/S
LEFT RENAL UPPER PARENCHYMA RI: 0.69
RIGHT RENAL UPPER PARENCHYMA MAX: 17.8 CM/S
RIGHT RENAL UPPER PARENCHYMA MIN: 5.9 CM/S
RIGHT RENAL UPPER PARENCHYMA RI: 0.67

## 2024-12-12 PROCEDURE — 93975 VASCULAR STUDY: CPT

## (undated) DEVICE — ADHS SKIN SURG TISS VISC PREMIERPRO EXOFIN HI/VISC FAST/DRY

## (undated) DEVICE — EXTENSION SET, MALE LUER LOCK ADAPTER WITH RETRACTABLE COLLAR

## (undated) DEVICE — CATH IV INSYTE AUTOGARD 14G 1 1/2IN ORNG

## (undated) DEVICE — ENDOPATH XCEL BLADELESS TROCARS WITH STABILITY SLEEVES: Brand: ENDOPATH XCEL

## (undated) DEVICE — LAPAROVUE VISIBILITY SYSTEM LAPAROSCOPIC SOLUTIONS: Brand: LAPAROVUE

## (undated) DEVICE — CATH CHOLANG 4.5F18IN BRGNDY

## (undated) DEVICE — DRAPE,REIN 53X77,STERILE: Brand: MEDLINE

## (undated) DEVICE — SUT MNCRYL PLS ANTIB UD 4/0 PS2 18IN

## (undated) DEVICE — DISPOSABLE MONOPOLAR ENDOSCOPIC CORD 10 FT. (3M): Brand: KIRWAN

## (undated) DEVICE — ENDOCUT SCISSOR TIP, DISPOSABLE: Brand: RENEW

## (undated) DEVICE — SUT VIC 0/0 UR6 27IN DYED J603H

## (undated) DEVICE — STPCK 3WY D201 DISCOFIX

## (undated) DEVICE — GLV SURG BIOGEL LTX PF 6 1/2

## (undated) DEVICE — PATIENT RETURN ELECTRODE, SINGLE-USE, CONTACT QUALITY MONITORING, ADULT, WITH 9FT CORD, FOR PATIENTS WEIGING OVER 33LBS. (15KG): Brand: MEGADYNE

## (undated) DEVICE — ENDOPATH XCEL UNIVERSAL TROCAR STABLILITY SLEEVES: Brand: ENDOPATH XCEL

## (undated) DEVICE — SOL NACL 0.9PCT 1000ML

## (undated) DEVICE — LOU LAP CHOLE: Brand: MEDLINE INDUSTRIES, INC.

## (undated) DEVICE — LAPAROSCOPIC SMOKE FILTRATION SYSTEM: Brand: PALL LAPAROSHIELD® PLUS LAPAROSCOPIC SMOKE FILTRATION SYSTEM

## (undated) DEVICE — ENDOPOUCH RETRIEVER SPECIMEN RETRIEVAL BAGS: Brand: ENDOPOUCH RETRIEVER

## (undated) DEVICE — DRP C/ARM 41X74IN

## (undated) DEVICE — CONTAINER,SPECIMEN,OR STERILE,4OZ: Brand: MEDLINE

## (undated) DEVICE — ENDOPATH PNEUMONEEDLE INSUFFLATION NEEDLES WITH LUER LOCK CONNECTORS 120MM: Brand: ENDOPATH

## (undated) DEVICE — APPL CHLORAPREP HI/LITE 26ML ORNG